# Patient Record
Sex: FEMALE | Race: WHITE | NOT HISPANIC OR LATINO | Employment: OTHER | URBAN - METROPOLITAN AREA
[De-identification: names, ages, dates, MRNs, and addresses within clinical notes are randomized per-mention and may not be internally consistent; named-entity substitution may affect disease eponyms.]

---

## 2018-03-26 DIAGNOSIS — Z12.39 SCREENING BREAST EXAMINATION: Primary | ICD-10-CM

## 2018-04-05 ENCOUNTER — HOSPITAL ENCOUNTER (OUTPATIENT)
Dept: RADIOLOGY | Facility: HOSPITAL | Age: 63
Discharge: HOME | End: 2018-04-05
Attending: OBSTETRICS & GYNECOLOGY
Payer: COMMERCIAL

## 2018-04-05 DIAGNOSIS — Z12.39 SCREENING BREAST EXAMINATION: ICD-10-CM

## 2018-04-05 PROCEDURE — 77067 SCR MAMMO BI INCL CAD: CPT | Mod: TC

## 2018-04-05 PROCEDURE — 77067 SCR MAMMO BI INCL CAD: CPT | Mod: 26,,, | Performed by: RADIOLOGY

## 2018-04-12 DIAGNOSIS — R10.9 ABDOMINAL PAIN: Primary | ICD-10-CM

## 2018-04-17 ENCOUNTER — HOSPITAL ENCOUNTER (OUTPATIENT)
Dept: RADIOLOGY | Facility: HOSPITAL | Age: 63
Discharge: HOME | End: 2018-04-17
Attending: FAMILY MEDICINE
Payer: COMMERCIAL

## 2018-04-17 DIAGNOSIS — K80.20 GALLSTONE: Primary | ICD-10-CM

## 2018-04-17 DIAGNOSIS — K80.20 GALLSTONE: ICD-10-CM

## 2018-04-17 DIAGNOSIS — R10.9 ABDOMINAL PAIN: ICD-10-CM

## 2018-04-17 PROCEDURE — 76705 ECHO EXAM OF ABDOMEN: CPT | Mod: 26,,, | Performed by: RADIOLOGY

## 2018-04-17 PROCEDURE — 76705 ECHO EXAM OF ABDOMEN: CPT | Mod: TC

## 2018-04-17 PROCEDURE — 78227 HEPATOBIL SYST IMAGE W/DRUG: CPT | Mod: 26,,, | Performed by: RADIOLOGY

## 2018-04-17 PROCEDURE — A9537 TC99M MEBROFENIN: HCPCS

## 2018-04-25 ENCOUNTER — OFFICE VISIT (OUTPATIENT)
Dept: SURGERY | Facility: CLINIC | Age: 63
End: 2018-04-25
Payer: COMMERCIAL

## 2018-04-25 VITALS
SYSTOLIC BLOOD PRESSURE: 145 MMHG | DIASTOLIC BLOOD PRESSURE: 93 MMHG | TEMPERATURE: 98 F | RESPIRATION RATE: 18 BRPM | BODY MASS INDEX: 23.95 KG/M2 | WEIGHT: 122 LBS | OXYGEN SATURATION: 96 % | HEART RATE: 85 BPM | HEIGHT: 60 IN

## 2018-04-25 DIAGNOSIS — Z12.11 ENCOUNTER FOR SCREENING COLONOSCOPY: Primary | ICD-10-CM

## 2018-04-25 DIAGNOSIS — K80.20 CALCULUS OF GALLBLADDER WITHOUT CHOLECYSTITIS WITHOUT OBSTRUCTION: ICD-10-CM

## 2018-04-25 DIAGNOSIS — R10.13 EPIGASTRIC PAIN: ICD-10-CM

## 2018-04-25 PROCEDURE — 99205 OFFICE O/P NEW HI 60 MIN: CPT | Mod: S$GLB,,, | Performed by: SURGERY

## 2018-04-25 RX ORDER — POLYETHYLENE GLYCOL 3350, SODIUM SULFATE ANHYDROUS, SODIUM BICARBONATE, SODIUM CHLORIDE, POTASSIUM CHLORIDE 236; 22.74; 6.74; 5.86; 2.97 G/4L; G/4L; G/4L; G/4L; G/4L
4 POWDER, FOR SOLUTION ORAL ONCE
Qty: 4000 ML | Refills: 0 | Status: SHIPPED | OUTPATIENT
Start: 2018-04-25 | End: 2018-04-25

## 2018-04-25 RX ORDER — SODIUM CHLORIDE 9 MG/ML
INJECTION, SOLUTION INTRAVENOUS CONTINUOUS
Status: CANCELLED | OUTPATIENT
Start: 2018-04-25
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
## 2018-04-27 ENCOUNTER — HOSPITAL ENCOUNTER (OUTPATIENT)
Dept: CARDIOLOGY | Facility: HOSPITAL | Age: 63
Discharge: HOME | End: 2018-04-27
Attending: SURGERY
Payer: COMMERCIAL

## 2018-04-27 DIAGNOSIS — Z12.11 ENCOUNTER FOR SCREENING COLONOSCOPY: ICD-10-CM

## 2018-04-27 PROCEDURE — 93005 ELECTROCARDIOGRAM TRACING: CPT

## 2018-05-01 ENCOUNTER — ANESTHESIA EVENT (OUTPATIENT)
Dept: SURGERY | Facility: HOSPITAL | Age: 63
End: 2018-05-01
Payer: COMMERCIAL

## 2018-05-01 ENCOUNTER — SURGERY (OUTPATIENT)
Age: 63
End: 2018-05-01

## 2018-05-01 ENCOUNTER — ANESTHESIA (OUTPATIENT)
Dept: SURGERY | Facility: HOSPITAL | Age: 63
End: 2018-05-01
Payer: COMMERCIAL

## 2018-05-01 ENCOUNTER — HOSPITAL ENCOUNTER (OUTPATIENT)
Facility: HOSPITAL | Age: 63
Discharge: HOME | End: 2018-05-01
Attending: SURGERY | Admitting: SURGERY
Payer: COMMERCIAL

## 2018-05-01 DIAGNOSIS — Z12.11 ENCOUNTER FOR SCREENING COLONOSCOPY: ICD-10-CM

## 2018-05-01 DIAGNOSIS — R10.13 EPIGASTRIC PAIN: Primary | ICD-10-CM

## 2018-05-01 PROCEDURE — G0121 COLON CA SCRN NOT HI RSK IND: HCPCS | Mod: ,,, | Performed by: SURGERY

## 2018-05-01 PROCEDURE — 27201012 HC FORCEPS, HOT/COLD, DISP: Performed by: SURGERY

## 2018-05-01 PROCEDURE — 63600175 PHARM REV CODE 636 W HCPCS: Performed by: NURSE ANESTHETIST, CERTIFIED REGISTERED

## 2018-05-01 PROCEDURE — 37000008 HC ANESTHESIA 1ST 15 MINUTES: Performed by: SURGERY

## 2018-05-01 PROCEDURE — 43239 EGD BIOPSY SINGLE/MULTIPLE: CPT | Mod: 51,,, | Performed by: SURGERY

## 2018-05-01 PROCEDURE — D9220A PRA ANESTHESIA: Mod: ,,, | Performed by: ANESTHESIOLOGY

## 2018-05-01 PROCEDURE — 43239 EGD BIOPSY SINGLE/MULTIPLE: CPT | Performed by: SURGERY

## 2018-05-01 PROCEDURE — 25000003 PHARM REV CODE 250: Performed by: NURSE ANESTHETIST, CERTIFIED REGISTERED

## 2018-05-01 PROCEDURE — G0105 COLORECTAL SCRN; HI RISK IND: HCPCS | Performed by: SURGERY

## 2018-05-01 PROCEDURE — 88342 IMHCHEM/IMCYTCHM 1ST ANTB: CPT | Performed by: PATHOLOGY

## 2018-05-01 PROCEDURE — 88305 TISSUE EXAM BY PATHOLOGIST: CPT | Mod: 26,,, | Performed by: PATHOLOGY

## 2018-05-01 PROCEDURE — 88342 IMHCHEM/IMCYTCHM 1ST ANTB: CPT | Mod: 26,,, | Performed by: PATHOLOGY

## 2018-05-01 PROCEDURE — 88305 TISSUE EXAM BY PATHOLOGIST: CPT | Performed by: PATHOLOGY

## 2018-05-01 PROCEDURE — 37000009 HC ANESTHESIA EA ADD 15 MINS: Performed by: SURGERY

## 2018-05-01 PROCEDURE — 45378 DIAGNOSTIC COLONOSCOPY: CPT | Performed by: SURGERY

## 2018-05-01 PROCEDURE — 25000003 PHARM REV CODE 250: Performed by: SURGERY

## 2018-05-01 RX ORDER — PROPOFOL 10 MG/ML
VIAL (ML) INTRAVENOUS
Status: DISCONTINUED | OUTPATIENT
Start: 2018-05-01 | End: 2018-05-01

## 2018-05-01 RX ORDER — MIDAZOLAM HYDROCHLORIDE 1 MG/ML
INJECTION, SOLUTION INTRAMUSCULAR; INTRAVENOUS
Status: DISCONTINUED | OUTPATIENT
Start: 2018-05-01 | End: 2018-05-01

## 2018-05-01 RX ORDER — EPHEDRINE SULFATE 50 MG/ML
INJECTION, SOLUTION INTRAVENOUS
Status: DISCONTINUED | OUTPATIENT
Start: 2018-05-01 | End: 2018-05-01

## 2018-05-01 RX ORDER — SODIUM CHLORIDE 9 MG/ML
INJECTION, SOLUTION INTRAVENOUS CONTINUOUS
Status: DISCONTINUED | OUTPATIENT
Start: 2018-05-01 | End: 2018-05-01 | Stop reason: HOSPADM

## 2018-05-01 RX ORDER — SODIUM, POTASSIUM,MAG SULFATES 17.5-3.13G
SOLUTION, RECONSTITUTED, ORAL ORAL
COMMUNITY
End: 2018-05-16 | Stop reason: ALTCHOICE

## 2018-05-01 RX ADMIN — PROPOFOL 20 MG: 10 INJECTION, EMULSION INTRAVENOUS at 11:05

## 2018-05-01 RX ADMIN — EPHEDRINE SULFATE 10 MG: 50 INJECTION INTRAMUSCULAR; INTRAVENOUS; SUBCUTANEOUS at 11:05

## 2018-05-01 RX ADMIN — PROPOFOL 30 MG: 10 INJECTION, EMULSION INTRAVENOUS at 11:05

## 2018-05-01 RX ADMIN — SODIUM CHLORIDE: 9 INJECTION, SOLUTION INTRAVENOUS at 09:05

## 2018-05-01 RX ADMIN — PROPOFOL 10 MG: 10 INJECTION, EMULSION INTRAVENOUS at 11:05

## 2018-05-01 RX ADMIN — MIDAZOLAM HYDROCHLORIDE 2 MG: 1 INJECTION, SOLUTION INTRAMUSCULAR; INTRAVENOUS at 10:05

## 2018-05-01 NOTE — PROVATION PATIENT INSTRUCTIONS
Discharge Summary/Instructions after an Endoscopic Procedure  Patient Name: Claudia Suresh  Patient MRN: [id number]  Patient YOB: 1955  Tuesday, May 01, 2018  Deven Boyd MD  RESTRICTIONS:  During your procedure today, you received medications for sedation.  These   medications may affect your judgment, balance and coordination.  Therefore,   for 24 hours, you have the following restrictions:   - DO NOT drive a car, operate machinery, make legal/financial decisions,   sign important papers or drink alcohol.    ACTIVITY:  The following day: return to full activity including work, except no heavy   lifting, straining or running for 3 days if polyps were removed.  DIET:  Eat and drink normally unless instructed otherwise.     TREATMENT FOR COMMON SIDE EFFECTS:  - Mild abdominal pain, nausea, belching, bloating or excessive gas:  rest,   eat lightly and use a heating pad.  - Sore Throat: treat with throat lozenges and/or gargle with warm salt   water.  - Because air was used during the procedure, expelling large amounts of air   from your rectum or belching is normal.  - If a bowel prep was taken, you may not have a bowel movement for 1-3 days.    This is normal.  SYMPTOMS TO WATCH FOR AND REPORT TO YOUR PHYSICIAN:  1. Abdominal pain or bloating, other than gas cramps.  2. Chest pain.  3. Back pain.  4. Signs of infection such as: chills or fever occurring within 24 hours   after the procedure.  5. Rectal bleeding, which would show as bright red, maroon, or black stools.   (A tablespoon of blood from the rectum is not serious, especially if   hemorrhoids are present.)  6. Vomiting.  7. Weakness or dizziness.  GO DIRECTLY TO THE NEAREST EMERGENCY ROOM IF YOU HAVE ANY OF THE FOLLOWING:      Difficulty breathing              Chills and/or fever over 101 F   Persistent vomiting and/or vomiting blood   Severe abdominal pain   Severe chest pain   Black, tarry stools   Bleeding- more than one  tablespoon   Any other symptom or condition that you feel may need urgent attention  Your doctor recommends these additional instructions:  If any biopsies were taken, your doctors clinic will contact you in 1 to 2   weeks with any results.  - Discharge patient to home (ambulatory).   - Resume previous diet.   - Continue present medications.   - Repeat colonoscopy in 10 years for surveillance.   - Return to my office in 2 weeks.  For questions, problems or results please call your physician - Deven Boyd MD at Work:  [phone].  [facility] EMERGENCY ROOM PHONE NUMBER: [phone]  IF A COMPLICATION OR EMERGENCY SITUATION ARISES AND YOU ARE UNABLE TO REACH   YOUR PHYSICIAN - GO DIRECTLY TO THE EMERGENCY ROOM.  MD Deven Puente MD  5/1/2018 11:42:55 AM  This report has been verified and signed electronically.
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
## 2018-05-01 NOTE — PROVATION PATIENT INSTRUCTIONS
Discharge Summary/Instructions after an Endoscopic Procedure  Patient Name: Claudia Suresh  Patient MRN: [id number]  Patient YOB: 1955  Tuesday, May 01, 2018  Deven Boyd MD  RESTRICTIONS:  During your procedure today, you received medications for sedation.  These   medications may affect your judgment, balance and coordination.  Therefore,   for 24 hours, you have the following restrictions:   - DO NOT drive a car, operate machinery, make legal/financial decisions,   sign important papers or drink alcohol.    ACTIVITY:  The following day: return to full activity including work, except no heavy   lifting, straining or running for 3 days if polyps were removed.  DIET:  Eat and drink normally unless instructed otherwise.     TREATMENT FOR COMMON SIDE EFFECTS:  - Mild abdominal pain, nausea, belching, bloating or excessive gas:  rest,   eat lightly and use a heating pad.  - Sore Throat: treat with throat lozenges and/or gargle with warm salt   water.  - Because air was used during the procedure, expelling large amounts of air   from your rectum or belching is normal.  - If a bowel prep was taken, you may not have a bowel movement for 1-3 days.    This is normal.  SYMPTOMS TO WATCH FOR AND REPORT TO YOUR PHYSICIAN:  1. Abdominal pain or bloating, other than gas cramps.  2. Chest pain.  3. Back pain.  4. Signs of infection such as: chills or fever occurring within 24 hours   after the procedure.  5. Rectal bleeding, which would show as bright red, maroon, or black stools.   (A tablespoon of blood from the rectum is not serious, especially if   hemorrhoids are present.)  6. Vomiting.  7. Weakness or dizziness.  GO DIRECTLY TO THE NEAREST EMERGENCY ROOM IF YOU HAVE ANY OF THE FOLLOWING:      Difficulty breathing              Chills and/or fever over 101 F   Persistent vomiting and/or vomiting blood   Severe abdominal pain   Severe chest pain   Black, tarry stools   Bleeding- more than one  tablespoon   Any other symptom or condition that you feel may need urgent attention  Your doctor recommends these additional instructions:  If any biopsies were taken, your doctors clinic will contact you in 1 to 2   weeks with any results.  - Discharge patient to home (ambulatory).   - Resume previous diet.   - Continue present medications.   - Await pathology results.   - Repeat upper endoscopy in 3 years for surveillance.   - Return to my office in 2 weeks.  For questions, problems or results please call your physician - Deven Boyd MD at Work:  [phone].  [facility] EMERGENCY ROOM PHONE NUMBER: [phone]  IF A COMPLICATION OR EMERGENCY SITUATION ARISES AND YOU ARE UNABLE TO REACH   YOUR PHYSICIAN - GO DIRECTLY TO THE EMERGENCY ROOM.  MD Deven Puente MD  5/1/2018 11:39:40 AM  This report has been verified and signed electronically.

## 2018-05-01 NOTE — ANESTHESIA POSTPROCEDURE EVALUATION
Anesthesia Post Evaluation    Patient: Claudia Suresh    Procedure(s) Performed: Procedure(s) (LRB):  COLONOSCOPY (N/A)  ESOPHAGOGASTRODUODENOSCOPY (EGD) (N/A)    Final Anesthesia Type: MAC  Patient location during evaluation: PACU  Patient participation: Yes- Able to Participate  Level of consciousness: awake and alert  Post-procedure vital signs: reviewed and stable  Pain management: adequate  Airway patency: patent  PONV status at discharge: No PONV  Anesthetic complications: no      Cardiovascular status: blood pressure returned to baseline  Respiratory status: unassisted  Hydration status: euvolemic  Follow-up not needed.        Visit Vitals  BP (!) 141/62   Pulse 87   Temp 36.7 °C (98.1 °F) (Oral)   Resp 18   Ht 5' (1.524 m)   Wt 55.3 kg (121 lb 14.6 oz)   SpO2 97%   Breastfeeding? No   BMI 23.81 kg/m²       Pain/Sherrie Score: Pain Assessment Performed: Yes (5/1/2018 12:05 PM)  Presence of Pain: complains of pain/discomfort (5/1/2018 12:05 PM)  Sherrie Score: 10 (5/1/2018 12:05 PM)

## 2018-05-01 NOTE — ANESTHESIA PREPROCEDURE EVALUATION
05/01/2018  Claudia Suresh is a 62 y.o., female.    Anesthesia Evaluation    I have reviewed the Patient Summary Reports.        Review of Systems  Anesthesia Hx:  Neg history of prior surgery. Denies Family Hx of Anesthesia complications.   Denies Personal Hx of Anesthesia complications.   Hematology/Oncology:  Hematology Normal   Oncology Normal     EENT/Dental:EENT/Dental Normal   Cardiovascular:   Hypertension, well controlled    Pulmonary:  Pulmonary Normal    Renal/:  Renal/ Normal     Hepatic/GI:  Hepatic/GI Normal    Musculoskeletal:  Musculoskeletal Normal    Neurological:  Neurology Normal    Endocrine:  Endocrine Normal    Dermatological:  Skin Normal    Psych:  Psychiatric Normal           Physical Exam  General:  Well nourished    Airway/Jaw/Neck:  AIRWAY FINDINGS: Normal      Eyes/Ears/Nose:  EYES/EARS/NOSE FINDINGS: Normal   Dental:  DENTAL FINDINGS: Normal   Chest/Lungs:  Chest/Lungs Clear    Heart/Vascular:  Heart Findings: Normal Heart murmur: negative Vascular Findings: Normal    Abdomen:  Abdomen Findings: Normal    Musculoskeletal:  Musculoskeletal Findings: Normal   Skin:  Skin Findings: Normal    Mental Status:  Mental Status Findings: Normal        Anesthesia Plan  Type of Anesthesia, risks & benefits discussed:  Anesthesia Type:  MAC  Patient's Preference:   Intra-op Monitoring Plan: standard ASA monitors  Intra-op Monitoring Plan Comments:   Post Op Pain Control Plan:   Post Op Pain Control Plan Comments:   Induction:    Beta Blocker:  Patient is not currently on a Beta-Blocker (No further documentation required).       Informed Consent:  Anesthesia consent signed with patient.  ASA Score: 2     Day of Surgery Review of History & Physical:    H&P update referred to the provider.         Ready For Surgery From Anesthesia Perspective.

## 2018-05-01 NOTE — DISCHARGE SUMMARY
Discharge Note        SUMMARY     Admit Date: 5/1/2018    Attending Physician: Deven Boyd MD     Discharge Physician: Deven Boyd MD    Discharge Date: 5/1/2018 11:45 AM      Hospital Course: Patient tolerated procedure well.     Disposition: Home or Self Care    Patient Instructions:   Current Discharge Medication List      CONTINUE these medications which have NOT CHANGED    Details   gabapentin (NEURONTIN) 300 MG capsule TAKE ONE CAPSULE BY MOUTH THREE TIMES DAILY  Qty: 90 capsule, Refills: 0    Associated Diagnoses: Status post cervical spinal fusion      oxycodone-acetaminophen (PERCOCET) 5-325 mg per tablet Take 1 tablet by mouth every 4 (four) hours as needed for Pain.  Qty: 90 tablet, Refills: 0    Associated Diagnoses: S/P cervical spinal fusion      sodium,potassium,mag sulfates (SUPREP BOWEL PREP KIT) 17.5-3.13-1.6 gram SolR Take by mouth.      spironolactone (ALDACTONE) 25 MG tablet Take 25 mg by mouth 2 (two) times daily.             Discharge Procedure Orders (must include Diet, Follow-up, Activity):    Discharge Procedure Orders (must include Diet, Follow-up, Activity)  Diet general     Activity as tolerated     Call MD for:  temperature >100.4     Call MD for:  persistent nausea and vomiting     Call MD for:  severe uncontrolled pain     Call MD for:  difficulty breathing, headache or visual disturbances     Call MD for:  redness, tenderness, or signs of infection (pain, swelling, redness, odor or green/yellow discharge around incision site)     Call MD for:  persistent dizziness or light-headedness          Follow Up:  Follow up as scheduled.  Resume routine diet.  Activity as tolerated.    No driving day of procedure.

## 2018-05-01 NOTE — INTERVAL H&P NOTE
The patient has been examined and the H&P has been reviewed:    I concur with the findings and no changes have occurred since H&P was written.    Surgery risks, benefits and alternative options discussed and understood by patient/family.          Active Hospital Problems    Diagnosis  POA    Epigastric pain [R10.13]  Yes      Resolved Hospital Problems    Diagnosis Date Resolved POA   No resolved problems to display.

## 2018-05-01 NOTE — TRANSFER OF CARE
Anesthesia Transfer of Care Note    Patient: Claudia Suresh    Procedure(s) Performed: Procedure(s) (LRB):  COLONOSCOPY (N/A)  ESOPHAGOGASTRODUODENOSCOPY (EGD) (N/A)    Patient location: PACU    Anesthesia Type: MAC    Transport from OR: Transported from OR on room air with adequate spontaneous ventilation    Post pain: adequate analgesia    Post assessment: no apparent anesthetic complications and tolerated procedure well    Post vital signs: stable    Level of consciousness: awake, alert and oriented    Nausea/Vomiting: no nausea/vomiting    Complications: none    Transfer of care protocol was followed      Last vitals:   Visit Vitals  /80 (BP Location: Right arm, Patient Position: Lying)   Pulse (!) 59   Temp 36.7 °C (98.1 °F) (Oral)   Resp 14   Ht 5' (1.524 m)   Wt 55.3 kg (121 lb 14.6 oz)   Breastfeeding? No   BMI 23.81 kg/m²

## 2018-05-03 VITALS
RESPIRATION RATE: 18 BRPM | HEIGHT: 60 IN | SYSTOLIC BLOOD PRESSURE: 141 MMHG | TEMPERATURE: 98 F | DIASTOLIC BLOOD PRESSURE: 62 MMHG | WEIGHT: 121.94 LBS | OXYGEN SATURATION: 97 % | BODY MASS INDEX: 23.94 KG/M2 | HEART RATE: 87 BPM

## 2018-05-07 ENCOUNTER — TELEPHONE (OUTPATIENT)
Dept: SURGERY | Facility: CLINIC | Age: 63
End: 2018-05-07

## 2018-05-07 NOTE — TELEPHONE ENCOUNTER
----- Message from Joel Boyd sent at 5/7/2018  9:01 AM CDT -----  Contact: Pt  Name of Who is Calling: Claudia      What is the request in detail: Pt is calling to schedule her follow up      Can the clinic reply by [facility]: no      [facility] [phone] (home)

## 2018-05-16 ENCOUNTER — OFFICE VISIT (OUTPATIENT)
Dept: SURGERY | Facility: CLINIC | Age: 63
End: 2018-05-16
Payer: COMMERCIAL

## 2018-05-16 VITALS
WEIGHT: 122 LBS | TEMPERATURE: 97 F | RESPIRATION RATE: 18 BRPM | SYSTOLIC BLOOD PRESSURE: 120 MMHG | HEIGHT: 61 IN | OXYGEN SATURATION: 95 % | HEART RATE: 64 BPM | DIASTOLIC BLOOD PRESSURE: 86 MMHG | BODY MASS INDEX: 23.03 KG/M2

## 2018-05-16 DIAGNOSIS — K80.20 SYMPTOMATIC CHOLELITHIASIS: ICD-10-CM

## 2018-05-16 DIAGNOSIS — K29.30 CHRONIC SUPERFICIAL GASTRITIS WITHOUT BLEEDING: Primary | ICD-10-CM

## 2018-05-16 PROCEDURE — 99213 OFFICE O/P EST LOW 20 MIN: CPT | Mod: S$GLB,,, | Performed by: SURGERY

## 2018-05-16 RX ORDER — SUCRALFATE 1 G/1
1 TABLET ORAL 4 TIMES DAILY
Qty: 120 TABLET | Refills: 0 | Status: SHIPPED | OUTPATIENT
Start: 2018-05-16 | End: 2018-10-29

## 2018-05-16 RX ORDER — PANTOPRAZOLE SODIUM 40 MG/1
40 TABLET, DELAYED RELEASE ORAL DAILY
Qty: 30 TABLET | Refills: 3 | Status: SHIPPED | OUTPATIENT
Start: 2018-05-16 | End: 2018-08-09

## 2018-05-16 NOTE — PROGRESS NOTES
"[facility] Surgery  Follow-up    Subjective:       Patient ID: Claudia Suresh is a 62 y.o. female.    Chief Complaint: Follow-up (EGD/Colonoscopy)      HPI:  Claudia Suresh is a 62 y.o. female with a history of epigastric pain now who was undergone EGD and colonoscopy.  EGD showed chronic gastritis on biopsy, mild.  Helicobacter pylori species negative. Since the last visit and the EGD colonoscopy the patient continues to have similar symptoms.  Epigastric pain that is worse after food ingestion.  No other aggravating or alleviating factors.  It has been going on since prior to the last visit.  No nausea or vomiting.  She presents today for repeat examination.    Review of Systems   Constitutional: Negative for activity change, appetite change, chills and fever.   HENT: Negative for congestion, dental problem and ear discharge.    Eyes: Negative for discharge and itching.   Respiratory: Negative for apnea, choking and chest tightness.    Cardiovascular: Negative for chest pain and leg swelling.   Gastrointestinal: Positive for abdominal pain. Negative for abdominal distention, anal bleeding, constipation, diarrhea and nausea.   Endocrine: Negative for cold intolerance and heat intolerance.   Genitourinary: Negative for difficulty urinating and dyspareunia.   Musculoskeletal: Negative for arthralgias and back pain.   Skin: Negative for color change and pallor.   Neurological: Negative for dizziness and facial asymmetry.   Hematological: Negative for adenopathy. Does not bruise/bleed easily.   Psychiatric/Behavioral: Negative for agitation and behavioral problems.       Objective:      Vitals:    05/16/18 0903   BP: 120/86   BP Location: Right arm   Patient Position: Sitting   Pulse: 64   Resp: 18   Temp: 97.4 °F (36.3 °C)   TempSrc: Oral   SpO2: 95%   Weight: 55.3 kg (122 lb)   Height: 5' 1" (1.549 m)     Physical Exam   Constitutional: She is oriented to person, place, and time. She appears " well-developed and well-nourished. No distress.   HENT:   Head: Normocephalic and atraumatic.   Eyes: EOM are normal. Pupils are equal, round, and reactive to light.   Neck: Normal range of motion. Neck supple. No thyromegaly present.   Cardiovascular: Normal rate and regular rhythm.    Pulmonary/Chest: Effort normal and breath sounds normal.   Abdominal: Soft. Bowel sounds are normal. She exhibits no distension. There is no tenderness.       Musculoskeletal: Normal range of motion. She exhibits no edema or deformity.   Neurological: She is alert and oriented to person, place, and time. No cranial nerve deficit.   Skin: Skin is warm. Capillary refill takes less than 2 seconds. No erythema.   Psychiatric: She has a normal mood and affect. Her behavior is normal.     Diagnostics Review: CT: Reviewed; I have reviewed the patient's CT scan from last fall 2017.  My interpretation of the images is that the patient does have cholelithiasis.  It is in line with the radiologist's read.     Assessment:       1. Chronic superficial gastritis without bleeding    2. Symptomatic cholelithiasis        Plan:   Chronic superficial gastritis without bleeding  -     pantoprazole (PROTONIX) 40 MG tablet; Take 1 tablet (40 mg total) by mouth once daily.  Dispense: 30 tablet; Refill: 3  -     sucralfate (CARAFATE) 1 gram tablet; Take 1 tablet (1 g total) by mouth 4 (four) times daily.  Dispense: 120 tablet; Refill: 0    Symptomatic cholelithiasis        Medical Decision Making/Counseling:  Patient has continued, worsening epigastric pain. Biopsies reviewed with the patient. This shows chronic gastritis.  Recommendations at this time are for initiation of Protonix and Carafate for a 2 week.  And have the patient re-presented for follow-up evaluation.  Ultimately I believe the patient is a candidate for a cholecystectomy given the gallstones seen on CT scan and the symptoms of bloating belching epigastric pain postprandial.  Follow-up 2  weeks for discussion of laparoscopic versus open cholecystectomy.

## 2018-05-30 ENCOUNTER — OFFICE VISIT (OUTPATIENT)
Dept: SURGERY | Facility: CLINIC | Age: 63
End: 2018-05-30
Payer: COMMERCIAL

## 2018-05-30 VITALS
WEIGHT: 123 LBS | RESPIRATION RATE: 18 BRPM | DIASTOLIC BLOOD PRESSURE: 80 MMHG | OXYGEN SATURATION: 96 % | BODY MASS INDEX: 24.15 KG/M2 | SYSTOLIC BLOOD PRESSURE: 148 MMHG | TEMPERATURE: 98 F | HEART RATE: 58 BPM | HEIGHT: 60 IN

## 2018-05-30 DIAGNOSIS — K80.20 SYMPTOMATIC CHOLELITHIASIS: Primary | ICD-10-CM

## 2018-05-30 PROCEDURE — 99214 OFFICE O/P EST MOD 30 MIN: CPT | Mod: S$GLB,,, | Performed by: SURGERY

## 2018-05-30 RX ORDER — SODIUM CHLORIDE 9 MG/ML
INJECTION, SOLUTION INTRAVENOUS CONTINUOUS
Status: CANCELLED | OUTPATIENT
Start: 2018-05-30

## 2018-05-30 NOTE — H&P
[facility] Surgery H&P Note    Subjective:       Patient ID: Claudia Suresh is a 62 y.o. female.    Chief Complaint: Follow-up (2 weeks Gastritis)    HPI:  Claudia Suresh is a 62 y.o. 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 Patient is underwent EGD which showed mild chronic gastritis.  She has been treated with Protonix as well as Carafate with no resolution.  Patient continues to endorse epigastric/right upper quadrant pain, bloating most notably after coffee with cream ran taken the morning.  No other aggravating or alleviating factors.  Patient recently underwent ultrasound and HIDA scan which were negative however patient previously will underwent MRI back in the fall which showed cholelithiasis.  Patient reports classic symptoms of symptomatic cholelithiasis.  Symptoms 6/10 when they occur.  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    Essential hypertension     Tobacco abuse 1/7/2016     Past Surgical History:   Procedure Laterality Date    COLONOSCOPY  12/06/2013    COLONOSCOPY N/A 5/1/2018    Procedure: COLONOSCOPY;  Surgeon: Deven Boyd MD;  Location: [facility];  Service: Endoscopy;  Laterality: N/A;    ESOPHAGOGASTRODUODENOSCOPY  12/06/2013    NECK SURGERY      none       Family History   Problem Relation Age of Onset    Stroke Mother     Ovarian cancer Mother     Cancer Father      Social History     Social History    Marital status:      Spouse name: N/A    Number of children: N/A    Years of education: N/A     Social History Main Topics    Smoking status: Current Every Day Smoker     Packs/day: 0.50     Types: Cigarettes    Smokeless tobacco: Never Used    Alcohol use  Yes      Comment: social    Drug use: No    Sexual activity: Yes     Partners: Male     Other Topics Concern    None     Social History Narrative    None       Current Outpatient Prescriptions   Medication Sig Dispense Refill    gabapentin (NEURONTIN) 300 MG capsule TAKE ONE CAPSULE BY MOUTH THREE TIMES DAILY 90 capsule 0    oxycodone-acetaminophen (PERCOCET) 5-325 mg per tablet Take 1 tablet by mouth every 4 (four) hours as needed for Pain. 90 tablet 0    pantoprazole (PROTONIX) 40 MG tablet Take 1 tablet (40 mg total) by mouth once daily. 30 tablet 3    spironolactone (ALDACTONE) 25 MG tablet Take 25 mg by mouth 2 (two) times daily.      sucralfate (CARAFATE) 1 gram tablet Take 1 tablet (1 g total) by mouth 4 (four) times daily. 120 tablet 0     Current Facility-Administered Medications   Medication Dose Route Frequency Provider Last Rate Last Dose    ceFOXItin (MEFOXIN) 2 g in dextrose 5 % 100 mL IVPB  2 g Intravenous On Call Procedure Deven Boyd MD         Review of patient's allergies indicates:  No Known Allergies    Review of Systems   Constitutional: Negative for activity change, appetite change, chills and fever.   HENT: Negative for congestion, dental problem and ear discharge.    Eyes: Negative for discharge and itching.   Respiratory: Negative for apnea, choking and chest tightness.    Cardiovascular: Negative for chest pain and leg swelling.   Gastrointestinal: Positive for abdominal distention and abdominal pain. Negative for anal bleeding, constipation, diarrhea and nausea.   Endocrine: Negative for cold intolerance and heat intolerance.   Genitourinary: Negative for difficulty urinating and dyspareunia.   Musculoskeletal: Negative for arthralgias and back pain.   Skin: Negative for color change and pallor.   Neurological: Negative for dizziness and facial asymmetry.   Hematological: Negative for adenopathy. Does not bruise/bleed easily.   Psychiatric/Behavioral: Negative for  agitation and behavioral problems.       Objective:      Vitals:    05/30/18 0849   BP: (!) 148/80   BP Location: Right arm   Patient Position: Sitting   Pulse: (!) 58   Resp: 18   Temp: 97.5 °F (36.4 °C)   TempSrc: Oral   SpO2: 96%   Weight: 55.8 kg (123 lb)   Height: 5' (1.524 m)     Physical Exam   Constitutional: She is oriented to person, place, and time. She appears well-developed and well-nourished. No distress.   HENT:   Head: Normocephalic and atraumatic.   Eyes: EOM are normal. Pupils are equal, round, and reactive to light.   Neck: Normal range of motion. Neck supple. No thyromegaly present.   Cardiovascular: Normal rate and regular rhythm.    Pulmonary/Chest: Effort normal and breath sounds normal.   Abdominal: Soft. Bowel sounds are normal. She exhibits no distension. There is no tenderness.       Musculoskeletal: Normal range of motion. She exhibits no edema or deformity.   Neurological: She is alert and oriented to person, place, and time. No cranial nerve deficit.   Skin: Skin is warm. Capillary refill takes less than 2 seconds. No erythema.   Psychiatric: She has a normal mood and affect. Her behavior is normal.       Lab Review:   CBC:   Lab Results   Component Value Date    WBC 5.61 04/27/2018    RBC 4.66 04/27/2018    HGB 14.4 04/27/2018    HCT 44.2 04/27/2018     04/27/2018     BMP:   Lab Results   Component Value Date     04/27/2018     04/27/2018    K 4.1 04/27/2018     04/27/2018    CO2 25 04/27/2018    BUN 8 04/27/2018    CREATININE 0.5 04/27/2018    CALCIUM 9.9 04/27/2018     Diagnostics Review: ECG: Reviewed   Previous US and HIDA scan reviewed.  Previous MRI reviewed to show stones.     Assessment:       1. Symptomatic cholelithiasis        Plan:   Symptomatic cholelithiasis  -     Case Request Operating Room: CHOLECYSTECTOMY-LAPAROSCOPIC  -     Place in Outpatient; Standing  -     Vital signs; Standing  -     Insert peripheral IV; Standing  -     Verify  beta-blocker dose taken within 24 hours if patient is prescribed beta-blocker; Standing  -     Height and weight; Standing  -     Intake and output; Standing  -     Verify discontinuation of antithrombotics; Standing  -     POCT glucose; Standing  -     Verify blood consent; Standing  -     Verify consent; Standing  -     Clip and Prep Abdomen Zyphoid to Pubis; Standing  -     Chlorhexidine (CHG) 2% Wipes; Standing  -     Hibiclens shower; Standing  -     Hibiclens shower; Standing  -     Notify Physician; Standing  -     Diet NPO; Standing  -     Early ambulation; Standing  -     Place SAMUEL hose; Standing  -     Place sequential compression device; Standing  -     Pulse Oximetry Q4H; Standing  -     Comprehensive metabolic panel; Standing    Other orders  -     0.9%  NaCl infusion; Inject into the vein continuous.  -     IP VTE LOW RISK PATIENT; Standing  -     ceFOXItin (MEFOXIN) 2 g in dextrose 5 % 100 mL IVPB; Inject 2 g into the vein On call Procedure (surgery).        Medical Decision Making/Counseling:  Patient has symptoms of symptomatic stone disease.  I believe the patient would benefit from a cholecystectomy.  She has been given 2 weeks of Carafate with Protonix to help with her gastritis with no resolution of symptoms.  Will proceed with laparoscopic versus open cholecystectomy.    I have reviewed the patient's history physical examination as well as the patient's laboratory and radiologic studies.  She has what appears to be symptomatic cholelithiasis.  Postprandial pain which is colicky in nature.  Patient on ultrasound shows cholelithiasis, no evidence of cholecystitis.  I believe given the patient's symptomatology she would benefit from an elective laparoscopic versus open cholecystectomy.  Today in clinic we had a counseling session which lasted 30-40 minutes.  Counseling involved risk and benefits discussion.  Risks of cholecystectomy were discussed in detail.  I discussed that there is a 1 and 200  chance (0.5%) common bile duct injury.  I discussed the common bile duct is the drainage tube of the liver.  I discussed and such patients with cholecystectomy which results in injury of the common bile duct, a larger surgery is required called a hepaticojejunostomy.  I further discussed the risk of conversion to an open operation.  I discussed that 100% of the time I start cholecystectomies laparoscopically in the elective setting, but only 95% of the time am I able to complete the cholecystectomy laparoscopically.  I discussed that 5% of the time in open (larger incision) surgery is required for the safety of the patient.  I discussed the reasons for conversion to an open operation (how cholecystectomies use to be always removed) included significant scarring, adhesions, bleeding, or concern for injury of the surrounding structures which needed repair etc.  I also discussed with the patient that the intrinsic risks of surgery include bleeding, infection, need for further surgeries, admission to the hospital, in the intrinsic risks of anesthesia for which the patient will have a discussion on the surgical date with the anesthesiologist about.  With this discussion, I drew the patient a picture to assist with her understanding.  At the end of the discussion the patient understood the risks and wished to proceed in the near future.

## 2018-06-13 DIAGNOSIS — Z09 POSTOP CHECK: Primary | ICD-10-CM

## 2018-06-13 RX ORDER — ONDANSETRON 4 MG/1
4 TABLET, FILM COATED ORAL EVERY 6 HOURS PRN
Qty: 30 TABLET | Refills: 0 | Status: SHIPPED | OUTPATIENT
Start: 2018-06-13 | End: 2018-06-23

## 2018-06-13 RX ORDER — OXYCODONE AND ACETAMINOPHEN 5; 325 MG/1; MG/1
1 TABLET ORAL EVERY 4 HOURS PRN
Qty: 30 TABLET | Refills: 0 | Status: ON HOLD | OUTPATIENT
Start: 2018-06-13 | End: 2018-06-18 | Stop reason: HOSPADM

## 2018-06-18 ENCOUNTER — ANESTHESIA EVENT (OUTPATIENT)
Dept: SURGERY | Facility: HOSPITAL | Age: 63
End: 2018-06-18
Payer: COMMERCIAL

## 2018-06-18 ENCOUNTER — ANESTHESIA (OUTPATIENT)
Dept: SURGERY | Facility: HOSPITAL | Age: 63
End: 2018-06-18
Payer: COMMERCIAL

## 2018-06-18 ENCOUNTER — HOSPITAL ENCOUNTER (OUTPATIENT)
Facility: HOSPITAL | Age: 63
Discharge: HOME | End: 2018-06-18
Attending: SURGERY | Admitting: SURGERY
Payer: COMMERCIAL

## 2018-06-18 VITALS
RESPIRATION RATE: 12 BRPM | HEART RATE: 57 BPM | HEIGHT: 61 IN | SYSTOLIC BLOOD PRESSURE: 127 MMHG | OXYGEN SATURATION: 94 % | DIASTOLIC BLOOD PRESSURE: 78 MMHG | BODY MASS INDEX: 23.03 KG/M2 | WEIGHT: 122 LBS | TEMPERATURE: 98 F

## 2018-06-18 DIAGNOSIS — K75.81 NASH (NONALCOHOLIC STEATOHEPATITIS): Primary | ICD-10-CM

## 2018-06-18 DIAGNOSIS — K80.20 SYMPTOMATIC CHOLELITHIASIS: ICD-10-CM

## 2018-06-18 LAB
ALBUMIN SERPL BCP-MCNC: 4.3 G/DL
ALP SERPL-CCNC: 75 U/L
ALT SERPL W/O P-5'-P-CCNC: 22 U/L
ANION GAP SERPL CALC-SCNC: 9 MMOL/L
AST SERPL-CCNC: 26 U/L
BILIRUB SERPL-MCNC: 0.9 MG/DL
BUN SERPL-MCNC: 9 MG/DL
CALCIUM SERPL-MCNC: 10 MG/DL
CHLORIDE SERPL-SCNC: 105 MMOL/L
CO2 SERPL-SCNC: 25 MMOL/L
CREAT SERPL-MCNC: 0.6 MG/DL
EST. GFR  (AFRICAN AMERICAN): >60 ML/MIN/1.73 M^2
EST. GFR  (NON AFRICAN AMERICAN): >60 ML/MIN/1.73 M^2
GLUCOSE SERPL-MCNC: 94 MG/DL
POTASSIUM SERPL-SCNC: 4 MMOL/L
PROT SERPL-MCNC: 7.4 G/DL
SODIUM SERPL-SCNC: 139 MMOL/L

## 2018-06-18 PROCEDURE — 71000016 HC POSTOP RECOV ADDL HR: Performed by: SURGERY

## 2018-06-18 PROCEDURE — 71000015 HC POSTOP RECOV 1ST HR: Performed by: SURGERY

## 2018-06-18 PROCEDURE — 63600175 PHARM REV CODE 636 W HCPCS: Performed by: NURSE ANESTHETIST, CERTIFIED REGISTERED

## 2018-06-18 PROCEDURE — 25000003 PHARM REV CODE 250: Performed by: SURGERY

## 2018-06-18 PROCEDURE — 25000003 PHARM REV CODE 250: Performed by: NURSE ANESTHETIST, CERTIFIED REGISTERED

## 2018-06-18 PROCEDURE — D9220A PRA ANESTHESIA: Mod: ,,, | Performed by: ANESTHESIOLOGY

## 2018-06-18 PROCEDURE — 36415 COLL VENOUS BLD VENIPUNCTURE: CPT

## 2018-06-18 PROCEDURE — 88304 TISSUE EXAM BY PATHOLOGIST: CPT | Mod: 26,,, | Performed by: PATHOLOGY

## 2018-06-18 PROCEDURE — 36000709 HC OR TIME LEV III EA ADD 15 MIN: Performed by: SURGERY

## 2018-06-18 PROCEDURE — 88307 TISSUE EXAM BY PATHOLOGIST: CPT | Mod: 26,,, | Performed by: PATHOLOGY

## 2018-06-18 PROCEDURE — 37000009 HC ANESTHESIA EA ADD 15 MINS: Performed by: SURGERY

## 2018-06-18 PROCEDURE — 36000708 HC OR TIME LEV III 1ST 15 MIN: Performed by: SURGERY

## 2018-06-18 PROCEDURE — 71000033 HC RECOVERY, INTIAL HOUR: Performed by: SURGERY

## 2018-06-18 PROCEDURE — 47562 LAPAROSCOPIC CHOLECYSTECTOMY: CPT | Mod: ,,, | Performed by: SURGERY

## 2018-06-18 PROCEDURE — 47379 UNLISTED LAPS PX LIVER: CPT | Mod: 80,,, | Performed by: SURGERY

## 2018-06-18 PROCEDURE — 88313 SPECIAL STAINS GROUP 2: CPT | Performed by: PATHOLOGY

## 2018-06-18 PROCEDURE — 80053 COMPREHEN METABOLIC PANEL: CPT

## 2018-06-18 PROCEDURE — 88307 TISSUE EXAM BY PATHOLOGIST: CPT | Performed by: PATHOLOGY

## 2018-06-18 PROCEDURE — 27201423 OPTIME MED/SURG SUP & DEVICES STERILE SUPPLY: Performed by: SURGERY

## 2018-06-18 PROCEDURE — 88313 SPECIAL STAINS GROUP 2: CPT | Mod: 26,,, | Performed by: PATHOLOGY

## 2018-06-18 PROCEDURE — 63600175 PHARM REV CODE 636 W HCPCS: Performed by: ANESTHESIOLOGY

## 2018-06-18 PROCEDURE — 37000008 HC ANESTHESIA 1ST 15 MINUTES: Performed by: SURGERY

## 2018-06-18 PROCEDURE — 47562 LAPAROSCOPIC CHOLECYSTECTOMY: CPT | Mod: 80,,, | Performed by: SURGERY

## 2018-06-18 PROCEDURE — 47379 UNLISTED LAPS PX LIVER: CPT | Mod: ,,, | Performed by: SURGERY

## 2018-06-18 RX ORDER — DIPHENHYDRAMINE HYDROCHLORIDE 50 MG/ML
12.5 INJECTION INTRAMUSCULAR; INTRAVENOUS
Status: DISCONTINUED | OUTPATIENT
Start: 2018-06-18 | End: 2018-06-18 | Stop reason: HOSPADM

## 2018-06-18 RX ORDER — DOCUSATE SODIUM 100 MG/1
100 CAPSULE, LIQUID FILLED ORAL DAILY
Qty: 30 CAPSULE | Refills: 0 | Status: SHIPPED | OUTPATIENT
Start: 2018-06-18 | End: 2018-10-29

## 2018-06-18 RX ORDER — SODIUM CHLORIDE, SODIUM LACTATE, POTASSIUM CHLORIDE, CALCIUM CHLORIDE 600; 310; 30; 20 MG/100ML; MG/100ML; MG/100ML; MG/100ML
125 INJECTION, SOLUTION INTRAVENOUS CONTINUOUS
Status: DISCONTINUED | OUTPATIENT
Start: 2018-06-18 | End: 2018-06-18 | Stop reason: HOSPADM

## 2018-06-18 RX ORDER — ONDANSETRON 4 MG/1
4 TABLET, FILM COATED ORAL EVERY 6 HOURS PRN
Qty: 30 TABLET | Refills: 0 | Status: SHIPPED | OUTPATIENT
Start: 2018-06-18 | End: 2018-06-28

## 2018-06-18 RX ORDER — ONDANSETRON 2 MG/ML
4 INJECTION INTRAMUSCULAR; INTRAVENOUS DAILY PRN
Status: DISCONTINUED | OUTPATIENT
Start: 2018-06-18 | End: 2018-06-18 | Stop reason: HOSPADM

## 2018-06-18 RX ORDER — GLYCOPYRROLATE 0.2 MG/ML
INJECTION INTRAMUSCULAR; INTRAVENOUS
Status: DISCONTINUED | OUTPATIENT
Start: 2018-06-18 | End: 2018-06-18

## 2018-06-18 RX ORDER — SODIUM CHLORIDE 9 MG/ML
INJECTION, SOLUTION INTRAVENOUS CONTINUOUS
Status: DISCONTINUED | OUTPATIENT
Start: 2018-06-18 | End: 2018-06-18 | Stop reason: HOSPADM

## 2018-06-18 RX ORDER — PROPOFOL 10 MG/ML
VIAL (ML) INTRAVENOUS
Status: DISCONTINUED | OUTPATIENT
Start: 2018-06-18 | End: 2018-06-18

## 2018-06-18 RX ORDER — SODIUM CHLORIDE, SODIUM LACTATE, POTASSIUM CHLORIDE, CALCIUM CHLORIDE 600; 310; 30; 20 MG/100ML; MG/100ML; MG/100ML; MG/100ML
INJECTION, SOLUTION INTRAVENOUS CONTINUOUS
Status: DISCONTINUED | OUTPATIENT
Start: 2018-06-18 | End: 2018-06-18 | Stop reason: HOSPADM

## 2018-06-18 RX ORDER — MIDAZOLAM HYDROCHLORIDE 1 MG/ML
INJECTION, SOLUTION INTRAMUSCULAR; INTRAVENOUS
Status: DISCONTINUED | OUTPATIENT
Start: 2018-06-18 | End: 2018-06-18

## 2018-06-18 RX ORDER — LIDOCAINE HYDROCHLORIDE 10 MG/ML
1 INJECTION, SOLUTION EPIDURAL; INFILTRATION; INTRACAUDAL; PERINEURAL ONCE
Status: DISCONTINUED | OUTPATIENT
Start: 2018-06-18 | End: 2018-06-18 | Stop reason: HOSPADM

## 2018-06-18 RX ORDER — CISATRACURIUM BESYLATE 2 MG/ML
INJECTION, SOLUTION INTRAVENOUS
Status: DISCONTINUED | OUTPATIENT
Start: 2018-06-18 | End: 2018-06-18

## 2018-06-18 RX ORDER — SUCCINYLCHOLINE CHLORIDE 20 MG/ML
INJECTION INTRAMUSCULAR; INTRAVENOUS
Status: DISCONTINUED | OUTPATIENT
Start: 2018-06-18 | End: 2018-06-18

## 2018-06-18 RX ORDER — NEOSTIGMINE METHYLSULFATE 1 MG/ML
INJECTION, SOLUTION INTRAVENOUS
Status: DISCONTINUED | OUTPATIENT
Start: 2018-06-18 | End: 2018-06-18

## 2018-06-18 RX ORDER — ERTAPENEM 1 G/1
INJECTION, POWDER, LYOPHILIZED, FOR SOLUTION INTRAMUSCULAR; INTRAVENOUS
Status: DISCONTINUED | OUTPATIENT
Start: 2018-06-18 | End: 2018-06-18

## 2018-06-18 RX ORDER — OXYCODONE AND ACETAMINOPHEN 10; 325 MG/1; MG/1
1 TABLET ORAL EVERY 6 HOURS PRN
Qty: 30 TABLET | Refills: 0 | Status: SHIPPED | OUTPATIENT
Start: 2018-06-18 | End: 2018-06-28

## 2018-06-18 RX ORDER — MEPERIDINE HYDROCHLORIDE 50 MG/ML
INJECTION INTRAMUSCULAR; INTRAVENOUS; SUBCUTANEOUS
Status: DISCONTINUED | OUTPATIENT
Start: 2018-06-18 | End: 2018-06-18

## 2018-06-18 RX ORDER — FAMOTIDINE 10 MG/ML
20 INJECTION INTRAVENOUS ONCE
Status: DISCONTINUED | OUTPATIENT
Start: 2018-06-18 | End: 2018-06-18 | Stop reason: HOSPADM

## 2018-06-18 RX ORDER — ONDANSETRON 2 MG/ML
INJECTION INTRAMUSCULAR; INTRAVENOUS
Status: DISCONTINUED | OUTPATIENT
Start: 2018-06-18 | End: 2018-06-18

## 2018-06-18 RX ORDER — BUPIVACAINE HYDROCHLORIDE AND EPINEPHRINE 5; 5 MG/ML; UG/ML
INJECTION, SOLUTION EPIDURAL; INTRACAUDAL; PERINEURAL
Status: DISCONTINUED | OUTPATIENT
Start: 2018-06-18 | End: 2018-06-18 | Stop reason: HOSPADM

## 2018-06-18 RX ORDER — MORPHINE SULFATE 10 MG/ML
2 INJECTION INTRAMUSCULAR; INTRAVENOUS; SUBCUTANEOUS EVERY 5 MIN PRN
Status: DISCONTINUED | OUTPATIENT
Start: 2018-06-18 | End: 2018-06-18 | Stop reason: HOSPADM

## 2018-06-18 RX ADMIN — MIDAZOLAM HYDROCHLORIDE 2 MG: 1 INJECTION, SOLUTION INTRAMUSCULAR; INTRAVENOUS at 10:06

## 2018-06-18 RX ADMIN — MORPHINE SULFATE 2 MG: 10 INJECTION INTRAVENOUS at 01:06

## 2018-06-18 RX ADMIN — SUCCINYLCHOLINE CHLORIDE 120 MG: 20 INJECTION, SOLUTION INTRAMUSCULAR; INTRAVENOUS at 10:06

## 2018-06-18 RX ADMIN — SODIUM CHLORIDE: 9 INJECTION, SOLUTION INTRAVENOUS at 10:06

## 2018-06-18 RX ADMIN — SODIUM CHLORIDE: 9 INJECTION, SOLUTION INTRAVENOUS at 11:06

## 2018-06-18 RX ADMIN — GLYCOPYRROLATE 0.2 MG: 0.2 INJECTION INTRAMUSCULAR; INTRAVENOUS at 11:06

## 2018-06-18 RX ADMIN — ONDANSETRON 4 MG: 2 INJECTION INTRAMUSCULAR; INTRAVENOUS at 11:06

## 2018-06-18 RX ADMIN — MEPERIDINE HYDROCHLORIDE 50 MG: 50 INJECTION INTRAMUSCULAR; INTRAVENOUS; SUBCUTANEOUS at 11:06

## 2018-06-18 RX ADMIN — CISATRACURIUM BESYLATE 4 MG: 2 INJECTION INTRAVENOUS at 11:06

## 2018-06-18 RX ADMIN — ERTAPENEM SODIUM 1 G: 1 INJECTION, POWDER, LYOPHILIZED, FOR SOLUTION INTRAMUSCULAR; INTRAVENOUS at 11:06

## 2018-06-18 RX ADMIN — GLYCOPYRROLATE 0.4 MG: 0.2 INJECTION INTRAMUSCULAR; INTRAVENOUS at 12:06

## 2018-06-18 RX ADMIN — PROPOFOL 150 MG: 10 INJECTION, EMULSION INTRAVENOUS at 10:06

## 2018-06-18 RX ADMIN — NEOSTIGMINE METHYLSULFATE 3 MG: 1 INJECTION INTRAVENOUS at 12:06

## 2018-06-18 NOTE — OP NOTE
[facility] - Periop Services  Operative Note     SUMMARY     Surgery Date: 6/18/2018     Surgeon(s) and Role:     * Adams Cole MD - Assisting     * Deven Boyd MD - Primary    Assistant: Erasmo    Pre-op Diagnosis:  Symptomatic cholelithiasis [K80.20]    Post-op Diagnosis:  Post-Op Diagnosis Codes:     * Symptomatic cholelithiasis [K80.20]    Operation:  1) Laparoscopic Cholecystectomy  2) Laparoscopic non-anatomic Liver Wedge Resection Segment 4  3) Reducible Umbilical hernia repair    Description of the findings of the procedure:  Symptomatic cholelithiasis [K80.20]    Estimated Blood Loss: 10cc         Specimens:   Specimen (12h ago through future)    Start     Ordered    06/18/18 1155  Specimen to Pathology - Surgery  Once     Comments:  Pre-op Diagnosis: Symptomatic cholelithiasis [K80.20]Post-op Diagnosis: SAME, NON ALCOHOLIC STEATOHEPATITISProcedure(s):CHOLECYSTECTOMY-LAPAROSCOPICBIOPSY, LIVER, LAPAROSCOPIC Number of specimens: 2Name of specimens: 1. GALLBLADDER 2. WEDGE RESECTION LIVER BX-SECTION 4      06/18/18 1156        Abx: Invanz 1 gram    Anesthesia: GETA    Indications for procedure    Ms Suresh is a 62 year old female who presented through clinic with evidence of abdominal pain. Patient underwent double endoscopy which showed gastritis and duodenitis however no evidence of Helicobacter pylori species.  Previously she had undergone MRI of her spine which showed cholelithiasis.  Patient continued postoperative of EGD to have abdominal pain in spite of Protonix and Carafate therapy.  Risk and benefits of laparoscopic cholecystectomy for symptomatic cholelithiasis were discussed in depth in clinic, and the patient wished to proceed today by signing informed consent.    Procedure in detail    The patient was brought back into the Operative Room,  placed on table in supine position.  General anesthesia was introduced via  the endotracheal tube.  A timeout was then  conducted with all in the  Operating Room in agreement, correct patient, correct procedure, correct  allergies and correct antibiotics.  The patient was then given 1 g Invanz  IV.  The patient's abdomen was then prepped and draped in the standard  sterile surgical fashion.    I then made a infraumbilical incision in a curvilinear fashion.   Infraumbilical incision was carried down to the fascia with Bovie  electrocautery.  Towel clips were used to elevate the umbilical stalk.  The  fascia was entered horizontally at the level of the umbilical hernia. Finger sweep was done of the anterior abdominal wall and determined to be devoid of adhesions.  Mari trocar was placed.  The abdomen was then  insufflated to 15 mmHg through the Mari trocar without evidence of  bradycardia episodes of the patient.  The patient was then placed in a head  up, left lateral position.    I then placed three additional trocars in the patient's right upper  quadrant under direct visualization.  These were 5-mm trocars.  Inspection of the liver revealed early cirrhosis with what appeared to be non alcoholic steatohepatitis changes.  I at this point call my partner in Dr. Cole for a 2nd opinion.  He agreed with the diagnosis of DAVEY based upon physical appearance of the liver. He recommended liver biopsy.  He at this point scrubbed in and remained scrubbed into the remainder of the case.  I then  placed the assistant's port on the dome of the gallbladder and retracted it  superiorly. At this point, I  then placed my retractor on the infundibulum of the gallbladder and  retracted it inferolaterally.  The cystic duct structures were then bluntly  dissected out with the [place] retractor.  Cystic duct and cystic artery  were visualized.  Cystic artery was behind the node of Calot.  The cystic  duct and cystic artery were cleaned off and a critical view of safety was  achieved with the liver seen behind both structures and in between  both  structures.  Two clips were placed proximally on both structures, one clip  distally.  Both structures were transected distally.  The gallbladder was  then retracted with adequate tension to allow for the Bovie electrocautery  to remove the gallbladder from the liver bed. The gallbladder was not entered.  Bile was not spilled.  The gallbladder was then dissected off, bovied off  the liver bed, it was placed in an EndoCatch bag.  EndoCatch bag was then  removed through the Mari trocar. The gallbladder was handed off for permanent  section.  The liver was then retracted superiorly with a grasper.  A spot on the periphery of segment 4 of the liver was chosen for liver wedge resection for biopsy purposes.  A Harmonic was used to make a wedge resection of the liver at this spot on the periphery of the gallbladder fossa.  The liver wedge resection was placed in an Endo-Catch bag and pulled out through the Quigley trocar and handed off as specimen for permanent section of liver wedge resection segment 4.  The  gallbladder bed was washed out copiously with  irrigant.  Hemostasis was  achieved in the gallbladder bed.  The clips placed previously on cystic  duct and cystic artery were visualized again.  There was no hemorrhage from  the artery.  There was no bile spillage from the cystic duct.  The patient  was then placed in a back to normal position, in the supine position.  The  right upper quadrant was irrigated out copiously with  irrigant until  clear fluid returned.  Three 5-mm trocars were removed under direct  visualization.  There was no hemorrhage from the port sites.  Insufflation  was released.  A Mari trocar was then removed.    Attention was then turned towards repair of the umbilical hernia.  The umbilical stalk was transected from the fascia underneath.  The umbilical hernia site was determined to be approximately 2 cm wide.  Kocher clamps placed laterally in the fascia was elevated.  0 Ethibond  sutures were used in a simple interrupted fashion to close the umbilical hernia. Approximately 6 sutures were used.  The umbilical stalk was then tacked back down to the fascia with a 3 0 Vicryl suture.   At this point, the umbilical site  was washed out with  irrigant.  3-0 Vicryl sutures were then used in a  simple subdermal fashion to close the subdermis at all sites.  The skin was  then run with a 4-0 Vicryl suture in a running subcuticular fashion at the  umbilical site.  Dermabond was placed over all four dressing.  The patient  was then reversed from general anesthesia, extubated in the OR, transferred  back to the Postoperative Care Unit in stable condition.  All counts were  correct at the end of the procedure including lap pads, instruments as well  as needles.

## 2018-06-18 NOTE — ANESTHESIA PREPROCEDURE EVALUATION
06/18/2018  Claudia Suresh is a 62 y.o., female.    Anesthesia Evaluation    I have reviewed the Patient Summary Reports.    I have reviewed the Nursing Notes.   I have reviewed the Medications.     Review of Systems  Anesthesia Hx:  No problems with previous Anesthesia    Social:  Smoker, Former Smoker    Cardiovascular:   Hypertension    Musculoskeletal:  Spine Disorders: cervical        Physical Exam  General:  Well nourished    Airway/Jaw/Neck:  Airway Findings: Mouth Opening: Normal Tongue: Normal  Mallampati: II  Improves to II with phonation.  Jaw/Neck Findings:  Neck ROM: Normal ROM      Dental:  Dental Findings: Upper Dentures, Lower Dentures   Chest/Lungs:  Chest/Lungs Findings: Clear to auscultation     Heart/Vascular:  Heart Findings: Rate: Normal  Rhythm: Regular Rhythm             Anesthesia Plan  Type of Anesthesia, risks & benefits discussed:  Anesthesia Type:  general  Patient's Preference:   Intra-op Monitoring Plan: standard ASA monitors  Intra-op Monitoring Plan Comments:   Post Op Pain Control Plan: IV/PO Opioids PRN  Post Op Pain Control Plan Comments:   Induction:   IV  Beta Blocker:  Patient is not currently on a Beta-Blocker (No further documentation required).       Informed Consent: Patient understands risks and agrees with Anesthesia plan.  Questions answered. Anesthesia consent signed with patient.  ASA Score: 3     Day of Surgery Review of History & Physical: I have interviewed and examined the patient. I have reviewed the patient's H&P dated:            Ready For Surgery From Anesthesia Perspective.

## 2018-06-18 NOTE — BRIEF OP NOTE
[facility] - Periop Services  Brief Operative Note     SUMMARY     Surgery Date: 6/18/2018     Surgeon(s) and Role:     * Adams Cole MD - Assisting     * Deven Boyd MD - Primary    Assistant: Erasmo    Pre-op Diagnosis:  Symptomatic cholelithiasis [K80.20]    Post-op Diagnosis:  Post-Op Diagnosis Codes:     * Symptomatic cholelithiasis [K80.20]    Operation:  1) Laparoscopic Cholecystectomy  2) Laparoscopic non-anatomic Liver Wedge Resection Segment 4  3) Reducible Umbilical hernia repair    Description of the findings of the procedure:  Symptomatic cholelithiasis [K80.20]    Estimated Blood Loss: 10cc         Specimens:   Specimen (12h ago through future)    Start     Ordered    06/18/18 1155  Specimen to Pathology - Surgery  Once     Comments:  Pre-op Diagnosis: Symptomatic cholelithiasis [K80.20]Post-op Diagnosis: SAME, NON ALCOHOLIC STEATOHEPATITISProcedure(s):CHOLECYSTECTOMY-LAPAROSCOPICBIOPSY, LIVER, LAPAROSCOPIC Number of specimens: 2Name of specimens: 1. GALLBLADDER 2. WEDGE RESECTION LIVER BX-SECTION 4      06/18/18 1156        Abx: Invanz 1 gram    Anesthesia: GETA

## 2018-06-18 NOTE — TRANSFER OF CARE
"Anesthesia Transfer of Care Note    Patient: Claudia Suresh    Procedure(s) Performed: Procedure(s) (LRB):  CHOLECYSTECTOMY-LAPAROSCOPIC (Bilateral)  BIOPSY, LIVER, LAPAROSCOPIC (N/A)    Patient location: PACU    Anesthesia Type: general    Transport from OR: Transported from OR on room air with adequate spontaneous ventilation    Post pain: adequate analgesia    Post assessment: no apparent anesthetic complications and tolerated procedure well    Post vital signs: stable    Level of consciousness: awake, alert and oriented    Nausea/Vomiting: no nausea/vomiting    Complications: none    Transfer of care protocol was followed      Last vitals:   Visit Vitals  BP (!) 143/84 (BP Location: Right arm, Patient Position: Lying)   Pulse (!) 54   Temp 36.6 °C (97.9 °F) (Oral)   Resp 18   Ht 5' 1" (1.549 m)   Wt 55.3 kg (122 lb)   SpO2 100%   Breastfeeding? No   BMI 23.05 kg/m²     "

## 2018-06-18 NOTE — ANESTHESIA POSTPROCEDURE EVALUATION
"Anesthesia Post Evaluation    Patient: Claudia Suresh    Procedure(s) Performed: Procedure(s) (LRB):  CHOLECYSTECTOMY-LAPAROSCOPIC (Bilateral)  BIOPSY, LIVER, LAPAROSCOPIC (N/A)    Final Anesthesia Type: general  Patient location during evaluation: PACU  Patient participation: Yes- Able to Participate  Level of consciousness: awake and alert  Post-procedure vital signs: reviewed and stable  Pain management: adequate  Airway patency: patent  PONV status at discharge: No PONV  Anesthetic complications: no      Cardiovascular status: blood pressure returned to baseline  Respiratory status: unassisted  Hydration status: euvolemic  Follow-up not needed.        Visit Vitals  /75   Pulse (!) 56   Temp 36.6 °C (97.9 °F) (Oral)   Resp 10   Ht 5' 1" (1.549 m)   Wt 55.3 kg (122 lb)   SpO2 95%   Breastfeeding? No   BMI 23.05 kg/m²       Pain/Sherrie Score: Pain Assessment Performed: Yes (6/18/2018  9:43 AM)  Presence of Pain: complains of pain/discomfort (6/18/2018  1:00 PM)  Pain Rating Prior to Med Admin: 8 (6/18/2018  1:14 PM)  Sherrie Score: 10 (6/18/2018  1:00 PM)      "

## 2018-06-18 NOTE — DISCHARGE INSTRUCTIONS
Cholecystectomy     Clips close off the duct connecting the gallbladder to the bile duct. The gallbladder is then removed.     Youve had painful attacks caused by gallstones. To treat the problem, your healthcare provider wants to remove your gallbladder. This surgery is called cholecystectomy. Removing the gallbladder can relieve pain. It will also prevent future attacks. You can live a healthy life without your gallbladder. You may also be able to go back to eating foods you enjoyed before your gallbladder problems started.  Before your surgery  Be prepared:  · Tell your provider what medicines you take. Include those bought over the counter. Also include herbs or supplements. Be sure to mention if you take prescription blood thinners. This includes warfarin, clopidogrel, and aspirin.  · Have any tests your provider asks for, such as blood tests.  · Dont eat or drink after midnight, the night before your surgery. This includes water, coffee, and mints. However, you may need to take some medicine with sips of water--talk with your healthcare provider.  The day of surgery  When you arrive, you will prepare for surgery:  · An IV line will be put into a vein in your arm or hand. This gives you fluids and medicine.  · An anesthesiologist will talk with you about anesthesia. This is medicine used to prevent pain. You will receive general anesthesia. This puts you into a state like deep sleep through the procedure.  During surgery  There are 2 methods for removing the gallbladder. Your healthcare provider will choose which method is best for you:  · Laparoscopic cholecystectomy. This is most common. During surgery, 2 to 4 small incisions are made. A thin tube with a camera is used. This is called a laparoscope. The scope is put through one of the incisions. It sends images to a video screen. Surgical tools are put through other incisions. The gallbladder is removed using the scope and these tools.  · Open  cholecystectomy. One larger incision is made. The surgeon sees and works through this incision. Open surgery is most often used when scarring or other factors make it a better choice for you.  In some cases, safety requires a change from laparoscopic to open surgery during the procedure.  After surgery  You will be sent to a room to wake up from the anesthesia. You will likely go home the same day. In some cases, an overnight stay is needed. If you had open cholecystectomy, you may need to stay in the hospital for a few days. When you are released to go home, have a family member or friend ready to drive you.  Risks and possible complications of gallbladder surgery  All surgeries have risks. The risks of gallbladder surgery include:  · Bleeding  · Infection  · Injury to the common bile duct or nearby organs  · Blood clots in the legs  · Bile leaks  · Hernia at incision site  · Pnemonia   Date Last Reviewed: 7/1/2016  © 0699-4167 [employer]. [address] [place] [address]. All rights reserved. This information is not intended as a substitute for professional medical care. Always follow your healthcare professional's instructions.        Discharge Instructions: After Your Surgery  Youve just had surgery. During surgery, you were given medicine called anesthesia to keep you relaxed and free of pain. After surgery, you may have some pain or nausea. This is common. Here are some tips for feeling better and getting well after surgery.     Stay on schedule with your medicine.   Going home  Your healthcare provider will show you how to take care of yourself when you go home. He or she will also answer your questions. Have an adult family member or friend drive you home. For the first 24 hours after your surgery:  · Do not drive or use heavy equipment.  · Do not make important decisions or sign legal papers.  · Do not drink alcohol.  · Have someone stay with you, if needed. He or she can watch for  problems and help keep you safe.  Be sure to go to all follow-up visits with your healthcare provider. And rest after your surgery for as long as your healthcare provider tells you to.  Coping with pain  If you have pain after surgery, pain medicine will help you feel better. Take it as told, before pain becomes severe. Also, ask your healthcare provider or pharmacist about other ways to control pain. This might be with heat, ice, or relaxation. And follow any other instructions your surgeon or nurse gives you.  Tips for taking pain medicine  To get the best relief possible, remember these points:  · Pain medicines can upset your stomach. Taking them with a little food may help.  · Most pain relievers taken by mouth need at least 20 to 30 minutes to start to work.  · Taking medicine on a schedule can help you remember to take it. Try to time your medicine so that you can take it before starting an activity. This might be before you get dressed, go for a walk, or sit down for dinner.  · Constipation is a common side effect of pain medicines. Call your healthcare provider before taking any medicines such as laxatives or stool softeners to help ease constipation. Also ask if you should skip any foods. Drinking lots of fluids and eating foods such as fruits and vegetables that are high in fiber can also help. Remember, do not take laxatives unless your surgeon has prescribed them.  · Drinking alcohol and taking pain medicine can cause dizziness and slow your breathing. It can even be deadly. Do not drink alcohol while taking pain medicine.  · Pain medicine can make you react more slowly to things. Do not drive or run machinery while taking pain medicine.  Your healthcare provider may tell you to take acetaminophen to help ease your pain. Ask him or her how much you are supposed to take each day. Acetaminophen or other pain relievers may interact with your prescription medicines or other over-the-counter (OTC) medicines.  Some prescription medicines have acetaminophen and other ingredients. Using both prescription and OTC acetaminophen for pain can cause you to overdose. Read the labels on your OTC medicines with care. This will help you to clearly know the list of ingredients, how much to take, and any warnings. It may also help you not take too much acetaminophen. If you have questions or do not understand the information, ask your pharmacist or healthcare provider to explain it to you before you take the OTC medicine.  Managing nausea  Some people have an upset stomach after surgery. This is often because of anesthesia, pain, or pain medicine, or the stress of surgery. These tips will help you handle nausea and eat healthy foods as you get better. If you were on a special food plan before surgery, ask your healthcare provider if you should follow it while you get better. These tips may help:  · Do not push yourself to eat. Your body will tell you when to eat and how much.  · Start off with clear liquids and soup. They are easier to digest.  · Next try semi-solid foods, such as mashed potatoes, applesauce, and gelatin, as you feel ready.  · Slowly move to solid foods. Dont eat fatty, rich, or spicy foods at first.  · Do not force yourself to have 3 large meals a day. Instead eat smaller amounts more often.  · Take pain medicines with a small amount of solid food, such as crackers or toast, to avoid nausea.     Call your surgeon if  · You still have pain an hour after taking medicine. The medicine may not be strong enough.  · You feel too sleepy, dizzy, or groggy. The medicine may be too strong.  · You have side effects like nausea, vomiting, or skin changes, such as rash, itching, or hives.       If you have obstructive sleep apnea  You were given anesthesia medicine during surgery to keep you comfortable and free of pain. After surgery, you may have more apnea spells because of this medicine and other medicines you were given.  The spells may last longer than usual.   At home:  · Keep using the continuous positive airway pressure (CPAP) device when you sleep. Unless your healthcare provider tells you not to, use it when you sleep, day or night. CPAP is a common device used to treat obstructive sleep apnea.  · Talk with your provider before taking any pain medicine, muscle relaxants, or sedatives. Your provider will tell you about the possible dangers of taking these medicines.  Date Last Reviewed: 12/1/2016  © 2181-5918 [employer]. [address] [place] [address], [place] [address]. All rights reserved. This information is not intended as a substitute for professional medical care. Always follow your healthcare professional's instructions.

## 2018-06-18 NOTE — H&P (VIEW-ONLY)
[facility] Surgery H&P Note    Subjective:       Patient ID: Claudia Suresh is a 62 y.o. female.    Chief Complaint: Follow-up (2 weeks Gastritis)    HPI:  Claudia Suresh is a 62 y.o. 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 Patient is underwent EGD which showed mild chronic gastritis.  She has been treated with Protonix as well as Carafate with no resolution.  Patient continues to endorse epigastric/right upper quadrant pain, bloating most notably after coffee with cream ran taken the morning.  No other aggravating or alleviating factors.  Patient recently underwent ultrasound and HIDA scan which were negative however patient previously will underwent MRI back in the fall which showed cholelithiasis.  Patient reports classic symptoms of symptomatic cholelithiasis.  Symptoms 6/10 when they occur.  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    Essential hypertension     Tobacco abuse 1/7/2016     Past Surgical History:   Procedure Laterality Date    COLONOSCOPY  12/06/2013    COLONOSCOPY N/A 5/1/2018    Procedure: COLONOSCOPY;  Surgeon: Deven Boyd MD;  Location: [facility];  Service: Endoscopy;  Laterality: N/A;    ESOPHAGOGASTRODUODENOSCOPY  12/06/2013    NECK SURGERY      none       Family History   Problem Relation Age of Onset    Stroke Mother     Ovarian cancer Mother     Cancer Father      Social History     Social History    Marital status:      Spouse name: N/A    Number of children: N/A    Years of education: N/A     Social History Main Topics    Smoking status: Current Every Day Smoker     Packs/day: 0.50     Types: Cigarettes    Smokeless tobacco: Never Used    Alcohol use  Yes      Comment: social    Drug use: No    Sexual activity: Yes     Partners: Male     Other Topics Concern    None     Social History Narrative    None       Current Outpatient Prescriptions   Medication Sig Dispense Refill    gabapentin (NEURONTIN) 300 MG capsule TAKE ONE CAPSULE BY MOUTH THREE TIMES DAILY 90 capsule 0    oxycodone-acetaminophen (PERCOCET) 5-325 mg per tablet Take 1 tablet by mouth every 4 (four) hours as needed for Pain. 90 tablet 0    pantoprazole (PROTONIX) 40 MG tablet Take 1 tablet (40 mg total) by mouth once daily. 30 tablet 3    spironolactone (ALDACTONE) 25 MG tablet Take 25 mg by mouth 2 (two) times daily.      sucralfate (CARAFATE) 1 gram tablet Take 1 tablet (1 g total) by mouth 4 (four) times daily. 120 tablet 0     Current Facility-Administered Medications   Medication Dose Route Frequency Provider Last Rate Last Dose    ceFOXItin (MEFOXIN) 2 g in dextrose 5 % 100 mL IVPB  2 g Intravenous On Call Procedure Deven Boyd MD         Review of patient's allergies indicates:  No Known Allergies    Review of Systems   Constitutional: Negative for activity change, appetite change, chills and fever.   HENT: Negative for congestion, dental problem and ear discharge.    Eyes: Negative for discharge and itching.   Respiratory: Negative for apnea, choking and chest tightness.    Cardiovascular: Negative for chest pain and leg swelling.   Gastrointestinal: Positive for abdominal distention and abdominal pain. Negative for anal bleeding, constipation, diarrhea and nausea.   Endocrine: Negative for cold intolerance and heat intolerance.   Genitourinary: Negative for difficulty urinating and dyspareunia.   Musculoskeletal: Negative for arthralgias and back pain.   Skin: Negative for color change and pallor.   Neurological: Negative for dizziness and facial asymmetry.   Hematological: Negative for adenopathy. Does not bruise/bleed easily.   Psychiatric/Behavioral: Negative for  agitation and behavioral problems.       Objective:      Vitals:    05/30/18 0849   BP: (!) 148/80   BP Location: Right arm   Patient Position: Sitting   Pulse: (!) 58   Resp: 18   Temp: 97.5 °F (36.4 °C)   TempSrc: Oral   SpO2: 96%   Weight: 55.8 kg (123 lb)   Height: 5' (1.524 m)     Physical Exam   Constitutional: She is oriented to person, place, and time. She appears well-developed and well-nourished. No distress.   HENT:   Head: Normocephalic and atraumatic.   Eyes: EOM are normal. Pupils are equal, round, and reactive to light.   Neck: Normal range of motion. Neck supple. No thyromegaly present.   Cardiovascular: Normal rate and regular rhythm.    Pulmonary/Chest: Effort normal and breath sounds normal.   Abdominal: Soft. Bowel sounds are normal. She exhibits no distension. There is no tenderness.       Musculoskeletal: Normal range of motion. She exhibits no edema or deformity.   Neurological: She is alert and oriented to person, place, and time. No cranial nerve deficit.   Skin: Skin is warm. Capillary refill takes less than 2 seconds. No erythema.   Psychiatric: She has a normal mood and affect. Her behavior is normal.       Lab Review:   CBC:   Lab Results   Component Value Date    WBC 5.61 04/27/2018    RBC 4.66 04/27/2018    HGB 14.4 04/27/2018    HCT 44.2 04/27/2018     04/27/2018     BMP:   Lab Results   Component Value Date     04/27/2018     04/27/2018    K 4.1 04/27/2018     04/27/2018    CO2 25 04/27/2018    BUN 8 04/27/2018    CREATININE 0.5 04/27/2018    CALCIUM 9.9 04/27/2018     Diagnostics Review: ECG: Reviewed   Previous US and HIDA scan reviewed.  Previous MRI reviewed to show stones.     Assessment:       1. Symptomatic cholelithiasis        Plan:   Symptomatic cholelithiasis  -     Case Request Operating Room: CHOLECYSTECTOMY-LAPAROSCOPIC  -     Place in Outpatient; Standing  -     Vital signs; Standing  -     Insert peripheral IV; Standing  -     Verify  beta-blocker dose taken within 24 hours if patient is prescribed beta-blocker; Standing  -     Height and weight; Standing  -     Intake and output; Standing  -     Verify discontinuation of antithrombotics; Standing  -     POCT glucose; Standing  -     Verify blood consent; Standing  -     Verify consent; Standing  -     Clip and Prep Abdomen Zyphoid to Pubis; Standing  -     Chlorhexidine (CHG) 2% Wipes; Standing  -     Hibiclens shower; Standing  -     Hibiclens shower; Standing  -     Notify Physician; Standing  -     Diet NPO; Standing  -     Early ambulation; Standing  -     Place SAMUEL hose; Standing  -     Place sequential compression device; Standing  -     Pulse Oximetry Q4H; Standing  -     Comprehensive metabolic panel; Standing    Other orders  -     0.9%  NaCl infusion; Inject into the vein continuous.  -     IP VTE LOW RISK PATIENT; Standing  -     ceFOXItin (MEFOXIN) 2 g in dextrose 5 % 100 mL IVPB; Inject 2 g into the vein On call Procedure (surgery).        Medical Decision Making/Counseling:  Patient has symptoms of symptomatic stone disease.  I believe the patient would benefit from a cholecystectomy.  She has been given 2 weeks of Carafate with Protonix to help with her gastritis with no resolution of symptoms.  Will proceed with laparoscopic versus open cholecystectomy.    I have reviewed the patient's history physical examination as well as the patient's laboratory and radiologic studies.  She has what appears to be symptomatic cholelithiasis.  Postprandial pain which is colicky in nature.  Patient on ultrasound shows cholelithiasis, no evidence of cholecystitis.  I believe given the patient's symptomatology she would benefit from an elective laparoscopic versus open cholecystectomy.  Today in clinic we had a counseling session which lasted 30-40 minutes.  Counseling involved risk and benefits discussion.  Risks of cholecystectomy were discussed in detail.  I discussed that there is a 1 and 200  chance (0.5%) common bile duct injury.  I discussed the common bile duct is the drainage tube of the liver.  I discussed and such patients with cholecystectomy which results in injury of the common bile duct, a larger surgery is required called a hepaticojejunostomy.  I further discussed the risk of conversion to an open operation.  I discussed that 100% of the time I start cholecystectomies laparoscopically in the elective setting, but only 95% of the time am I able to complete the cholecystectomy laparoscopically.  I discussed that 5% of the time in open (larger incision) surgery is required for the safety of the patient.  I discussed the reasons for conversion to an open operation (how cholecystectomies use to be always removed) included significant scarring, adhesions, bleeding, or concern for injury of the surrounding structures which needed repair etc.  I also discussed with the patient that the intrinsic risks of surgery include bleeding, infection, need for further surgeries, admission to the hospital, in the intrinsic risks of anesthesia for which the patient will have a discussion on the surgical date with the anesthesiologist about.  With this discussion, I drew the patient a picture to assist with her understanding.  At the end of the discussion the patient understood the risks and wished to proceed in the near future.

## 2018-06-18 NOTE — DISCHARGE SUMMARY
Discharge Note        SUMMARY     Admit Date: 6/18/2018    Attending Physician: Deven Boyd MD     Discharge Physician: Deven Boyd MD    Discharge Date: 6/18/2018 12:31 PM      Hospital Course: Patient tolerated procedure well.     Disposition: Home or Self Care    Patient Instructions:   Current Discharge Medication List      START taking these medications    Details   docusate sodium (COLACE) 100 MG capsule Take 1 capsule (100 mg total) by mouth once daily.  Qty: 30 capsule, Refills: 0      !! ondansetron (ZOFRAN) 4 MG tablet Take 1 tablet (4 mg total) by mouth every 6 (six) hours as needed for Nausea.  Qty: 30 tablet, Refills: 0      oxyCODONE-acetaminophen (PERCOCET)  mg per tablet Take 1 tablet by mouth every 6 (six) hours as needed for Pain.  Qty: 30 tablet, Refills: 0       !! - Potential duplicate medications found. Please discuss with provider.      CONTINUE these medications which have NOT CHANGED    Details   gabapentin (NEURONTIN) 300 MG capsule TAKE ONE CAPSULE BY MOUTH THREE TIMES DAILY  Qty: 90 capsule, Refills: 0    Associated Diagnoses: Status post cervical spinal fusion      !! ondansetron (ZOFRAN) 4 MG tablet Take 1 tablet (4 mg total) by mouth every 6 (six) hours as needed for Nausea.  Qty: 30 tablet, Refills: 0    Associated Diagnoses: Postop check      pantoprazole (PROTONIX) 40 MG tablet Take 1 tablet (40 mg total) by mouth once daily.  Qty: 30 tablet, Refills: 3    Associated Diagnoses: Chronic superficial gastritis without bleeding      spironolactone (ALDACTONE) 25 MG tablet Take 25 mg by mouth 2 (two) times daily.      sucralfate (CARAFATE) 1 gram tablet Take 1 tablet (1 g total) by mouth 4 (four) times daily.  Qty: 120 tablet, Refills: 0    Associated Diagnoses: Chronic superficial gastritis without bleeding       !! - Potential duplicate medications found. Please discuss with provider.      STOP taking these medications       oxyCODONE-acetaminophen  (PERCOCET) 5-325 mg per tablet Comments:   Reason for Stopping:               Discharge Procedure Orders (must include Diet, Follow-up, Activity):    Discharge Procedure Orders (must include Diet, Follow-up, Activity)  Diet general     Activity as tolerated     Call MD for:  temperature >100.4     Call MD for:  persistent nausea and vomiting     Call MD for:  severe uncontrolled pain     Call MD for:  difficulty breathing, headache or visual disturbances     Call MD for:  redness, tenderness, or signs of infection (pain, swelling, redness, odor or green/yellow discharge around incision site)     Call MD for:  persistent dizziness or light-headedness     Remove dressing in 48 hours          Follow Up:  Follow up as scheduled.  Resume routine diet.  Activity as tolerated.    No driving day of procedure.

## 2018-06-21 ENCOUNTER — TELEPHONE (OUTPATIENT)
Dept: FAMILY MEDICINE | Facility: CLINIC | Age: 63
End: 2018-06-21

## 2018-06-21 NOTE — TELEPHONE ENCOUNTER
"  Spoke to patient. She states, " someone has already changed this in the computer for me.  Thank you." No other concerns voiced at this time.    Return call.  No answer. Left message for return call.      ----- Message from Tammy Perez sent at 6/21/2018  9:19 AM CDT -----  Patient requesting to speak with nurse concerning switching appointment date with spouse on July 6th/patient has appointment on July 20th/please call patient back at [phone] to advise.    "

## 2018-06-27 NOTE — PHYSICIAN QUERY
PT Name: Claudia Suresh  MR #: [id number]     Physician Query Form - Documentation Clarification      CDS/: Diann Parry               Contact information:[email or web address]    This form is a permanent document in the medical record.     Query Date: June 27, 2018    By submitting this query, we are merely seeking further clarification of documentation. Please utilize your independent clinical judgment when addressing the question(s) below.    The Medical record reflects the following:    Supporting Clinical Findings Location in Medical Record   2. Liver, section 4, wedge biopsy:  - Minimal steatosis, predominantly macrovesicular, less than 5%.  - Bile duct hematoma     Path report                                                                                      Doctor, Please specify if the bile duct hematoma, per the path report, was an intraoperative injury.    Provider Use Only           This is not an intra-operative injury.  They are referring to within the pathology liver specimen not the Common bile duct.  This is a normal pathology report from a liver biopsy.                                                                                                                     [  ] Clinically undetermined

## 2018-06-29 ENCOUNTER — OFFICE VISIT (OUTPATIENT)
Dept: SURGERY | Facility: CLINIC | Age: 63
End: 2018-06-29
Payer: COMMERCIAL

## 2018-06-29 VITALS
SYSTOLIC BLOOD PRESSURE: 136 MMHG | TEMPERATURE: 98 F | RESPIRATION RATE: 18 BRPM | DIASTOLIC BLOOD PRESSURE: 82 MMHG | OXYGEN SATURATION: 95 % | WEIGHT: 121 LBS | HEIGHT: 60 IN | BODY MASS INDEX: 23.75 KG/M2 | HEART RATE: 69 BPM

## 2018-06-29 DIAGNOSIS — Z09 POSTOP CHECK: Primary | ICD-10-CM

## 2018-06-29 PROCEDURE — 99024 POSTOP FOLLOW-UP VISIT: CPT | Mod: S$GLB,,, | Performed by: SURGERY

## 2018-06-29 NOTE — PROGRESS NOTES
General Surgery  [facility]  Follow-up    HPI/Follow-up exam:  Claudia Suresh is a 62 y.o. female status post laparoscopic cholecystectomy.  Patient is doing very well since surgery. No issues.  Liver biopsy reviewed with patient showed mild fibrosis.  No evidence of cirrhosis.  She presents today for follow-up examination.    PHYSICAL EXAM:  /82 (BP Location: Left arm, Patient Position: Sitting)   Pulse 69   Temp 98 °F (36.7 °C) (Oral)   Resp 18   Ht 5' (1.524 m)   Wt 54.9 kg (121 lb)   SpO2 95%   BMI 23.63 kg/m²   Gen: Wd Wn female in NAD  Heent: Nc/At, MMM  Cv: RRR  Lung: Non-labored breathing, clear bilaterally  Abd: Soft, non-tender, non-distended, surgical sites clean dry intact no signs or symptoms of infection.  Ext: No cyanosis clubbing or edema    Pathology reviewed with the patient today and she was given a copy.    Assessment:  Claudia Suresh is a 62 y.o. female s/p laparoscopic cholecystectomy with liver biopsy.    Plan/Medical Decision Making:  Patient doing well the postoperative phase.  And pain significantly improved.  Satisfactory postsurgical recovery.  Follow up with surgery p.r.n.    No Follow-up on file.

## 2018-07-06 ENCOUNTER — OFFICE VISIT (OUTPATIENT)
Dept: FAMILY MEDICINE | Facility: CLINIC | Age: 63
End: 2018-07-06
Payer: MEDICARE

## 2018-07-06 VITALS
OXYGEN SATURATION: 97 % | RESPIRATION RATE: 18 BRPM | SYSTOLIC BLOOD PRESSURE: 141 MMHG | HEART RATE: 53 BPM | BODY MASS INDEX: 23.95 KG/M2 | DIASTOLIC BLOOD PRESSURE: 77 MMHG | HEIGHT: 60 IN | TEMPERATURE: 98 F | WEIGHT: 122 LBS

## 2018-07-06 DIAGNOSIS — R60.9 EDEMA, UNSPECIFIED TYPE: Primary | ICD-10-CM

## 2018-07-06 DIAGNOSIS — Z79.899 ENCOUNTER FOR LONG-TERM (CURRENT) USE OF MEDICATIONS: ICD-10-CM

## 2018-07-06 PROCEDURE — 3077F SYST BP >= 140 MM HG: CPT | Mod: CPTII,S$GLB,, | Performed by: FAMILY MEDICINE

## 2018-07-06 PROCEDURE — 99202 OFFICE O/P NEW SF 15 MIN: CPT | Mod: S$GLB,,, | Performed by: FAMILY MEDICINE

## 2018-07-06 PROCEDURE — 3008F BODY MASS INDEX DOCD: CPT | Mod: CPTII,S$GLB,, | Performed by: FAMILY MEDICINE

## 2018-07-06 PROCEDURE — 3078F DIAST BP <80 MM HG: CPT | Mod: CPTII,S$GLB,, | Performed by: FAMILY MEDICINE

## 2018-07-06 RX ORDER — HYDROCHLOROTHIAZIDE 12.5 MG/1
12.5 TABLET ORAL DAILY
Qty: 30 TABLET | Refills: 2 | Status: SHIPPED | OUTPATIENT
Start: 2018-07-06 | End: 2018-09-17 | Stop reason: SDUPTHER

## 2018-07-06 NOTE — PROGRESS NOTES
Subjective:       Patient ID: Claudia Suresh is a 62 y.o. female.    Chief Complaint: Follow-up (medication change)    HPI   Ms. Suresh presents to [facility] care. Recently had her gallbladder removed. Colonoscopy is up to date. Medical history reviewed.    Complains of worsened anxiety due to her  being diagnosed with cancer. Not currently taking anything for anxiety and does not express any desire to start medication for it at this time.    Has been on spironolactone for several years for recurrent fluid in her ankles. No known history of CHF. Denies any history of abnormal potassium levels.    Review of Systems   Constitutional: Negative for chills, fatigue, fever and unexpected weight change.   Respiratory: Negative for cough, chest tightness, shortness of breath and wheezing.    Cardiovascular: Positive for leg swelling. Negative for chest pain and palpitations.   Musculoskeletal: Positive for back pain and neck pain. Negative for arthralgias and myalgias.   Psychiatric/Behavioral: Negative for decreased concentration, dysphoric mood, hallucinations and suicidal ideas. The patient is nervous/anxious.          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    Essential hypertension     Tobacco abuse 1/7/2016     Past Surgical History:   Procedure Laterality Date    CHOLECYSTECTOMY  06/18/2018    COLONOSCOPY  12/06/2013    COLONOSCOPY N/A 5/1/2018    Procedure: COLONOSCOPY;  Surgeon: Deven Boyd MD;  Location: [facility] ENDO;  Service: Endoscopy;  Laterality: N/A;    ESOPHAGOGASTRODUODENOSCOPY  12/06/2013    LAPAROSCOPIC BIOPSY OF LIVER N/A 6/18/2018    Procedure: BIOPSY, LIVER, LAPAROSCOPIC;  Surgeon: Deven Boyd MD;  Location: [facility] OR;  Service: General;  Laterality: N/A;    LAPAROSCOPIC CHOLECYSTECTOMY Bilateral 6/18/2018    Procedure: CHOLECYSTECTOMY-LAPAROSCOPIC;  Surgeon: Deven Boyd MD;   Location: [facility] OR;  Service: General;  Laterality: Bilateral;    NECK SURGERY      C4-5, C5-6 fusion; discectomy     none       Social History     Social History    Marital status:      Spouse name: N/A    Number of children: N/A    Years of education: N/A     Occupational History    Not on file.     Social History Main Topics    Smoking status: Former Smoker     Packs/day: 1.00     Years: 40.00     Types: Cigarettes     Quit date: 2016    Smokeless tobacco: Never Used    Alcohol use Yes      Comment: beer occasional    Drug use: No    Sexual activity: Yes     Partners: Male     Other Topics Concern    Not on file     Social History Narrative    No narrative on file     Family History   Problem Relation Age of Onset    Stroke Mother     Ovarian cancer Mother     Cancer Mother     Cancer Father        Objective:      BP (!) 141/77   Pulse (!) 53   Temp 97.5 °F (36.4 °C) (Tympanic)   Resp 18   Ht 5' (1.524 m)   Wt 55.3 kg (122 lb)   SpO2 97%   BMI 23.83 kg/m²   Physical Exam   Constitutional: She is oriented to person, place, and time. She appears well-developed and well-nourished. No distress.   Eyes: Conjunctivae and EOM are normal. Pupils are equal, round, and reactive to light. No scleral icterus.   Neurological: She is alert and oriented to person, place, and time.   Skin: Skin is warm and dry. No rash noted. She is not diaphoretic.   Psychiatric: She has a normal mood and affect. Her behavior is normal. Judgment and thought content normal.   Vitals reviewed.      Assessment:       1. Edema, unspecified type    2. Encounter for long-term (current) use of medications        Plan:       Edema, unspecified type  -     Stopping spironolactone, as it's causing stomach upset; changing diuretic to hctz; check electrolytes in approx 3 weeks  -     hydroCHLOROthiazide (HYDRODIURIL) 12.5 MG Tab; Take 1 tablet (12.5 mg total) by mouth once daily.  Dispense: 30 tablet; Refill: 2    Encounter  for long-term (current) use of medications  -     Basic metabolic panel; Future; Expected date: 07/27/2018  -     Magnesium; Future; Expected date: 07/27/2018            Risks, benefits, and side effects were discussed with the patient. All questions were answered to the fullest satisfaction of the patient, and pt verbalized understanding and agreement to treatment plan. Pt was to call with any new or worsening symptoms, or present to the ER.

## 2018-07-17 ENCOUNTER — TELEPHONE (OUTPATIENT)
Dept: FAMILY MEDICINE | Facility: CLINIC | Age: 63
End: 2018-07-17

## 2018-07-17 NOTE — TELEPHONE ENCOUNTER
"Pt reports that after she takes her HCTZ she feels lightheaded and has stopped taking this medication due to dropping her SBP in the 90's. Educated patient on medication, side effects, and benefits. She verbalizes understanding. Stated, "I just wanted to let her know that I stopped it and I am not going to get the bloodwork done since I am not taking it".   "

## 2018-07-17 NOTE — TELEPHONE ENCOUNTER
----- Message from Ashley Mullins sent at 7/17/2018  9:06 AM CDT -----  Contact: self  States she cannot take her fluid pills that were prescribed, they make her blood pressure drop too low. Please call back at [phone] (home)       [employer] [address] [place] [address] AT [address]  [address] [place] [address]  Phone: [phone] Fax: [phone]

## 2018-07-23 ENCOUNTER — LAB VISIT (OUTPATIENT)
Dept: LAB | Facility: HOSPITAL | Age: 63
End: 2018-07-23
Attending: FAMILY MEDICINE
Payer: MEDICARE

## 2018-07-23 DIAGNOSIS — Z79.899 NEED FOR PROPHYLACTIC CHEMOTHERAPY: Primary | ICD-10-CM

## 2018-07-23 LAB
ANION GAP SERPL CALC-SCNC: 7 MMOL/L
BUN SERPL-MCNC: 8 MG/DL
CALCIUM SERPL-MCNC: 9.4 MG/DL
CHLORIDE SERPL-SCNC: 101 MMOL/L
CO2 SERPL-SCNC: 28 MMOL/L
CREAT SERPL-MCNC: 0.7 MG/DL
EST. GFR  (AFRICAN AMERICAN): >60 ML/MIN/1.73 M^2
EST. GFR  (NON AFRICAN AMERICAN): >60 ML/MIN/1.73 M^2
GLUCOSE SERPL-MCNC: 89 MG/DL
MAGNESIUM SERPL-MCNC: 1.8 MG/DL
POTASSIUM SERPL-SCNC: 3.4 MMOL/L
SODIUM SERPL-SCNC: 136 MMOL/L

## 2018-07-23 PROCEDURE — 36415 COLL VENOUS BLD VENIPUNCTURE: CPT

## 2018-07-23 PROCEDURE — 83735 ASSAY OF MAGNESIUM: CPT

## 2018-07-23 PROCEDURE — 80048 BASIC METABOLIC PNL TOTAL CA: CPT

## 2018-07-24 ENCOUNTER — TELEPHONE (OUTPATIENT)
Dept: FAMILY MEDICINE | Facility: CLINIC | Age: 63
End: 2018-07-24

## 2018-08-09 ENCOUNTER — OFFICE VISIT (OUTPATIENT)
Dept: SURGERY | Facility: CLINIC | Age: 63
End: 2018-08-09
Payer: MEDICARE

## 2018-08-09 VITALS
OXYGEN SATURATION: 95 % | HEART RATE: 58 BPM | SYSTOLIC BLOOD PRESSURE: 130 MMHG | DIASTOLIC BLOOD PRESSURE: 79 MMHG | TEMPERATURE: 98 F | HEIGHT: 61 IN | BODY MASS INDEX: 22.47 KG/M2 | WEIGHT: 119 LBS

## 2018-08-09 DIAGNOSIS — R10.13 EPIGASTRIC ABDOMINAL PAIN: Primary | ICD-10-CM

## 2018-08-09 DIAGNOSIS — R10.13 EPIGASTRIC ABDOMINAL PAIN: ICD-10-CM

## 2018-08-09 PROCEDURE — 3008F BODY MASS INDEX DOCD: CPT | Mod: CPTII,S$GLB,, | Performed by: SURGERY

## 2018-08-09 PROCEDURE — 3078F DIAST BP <80 MM HG: CPT | Mod: CPTII,S$GLB,, | Performed by: SURGERY

## 2018-08-09 PROCEDURE — 3075F SYST BP GE 130 - 139MM HG: CPT | Mod: CPTII,S$GLB,, | Performed by: SURGERY

## 2018-08-09 PROCEDURE — 99024 POSTOP FOLLOW-UP VISIT: CPT | Mod: S$GLB,,, | Performed by: SURGERY

## 2018-08-12 NOTE — PROGRESS NOTES
[facility] Surgery  Follow-up    Subjective:       Patient ID: Claudia Suresh is a 62 y.o. female.    Chief Complaint: Consult and Abdominal Pain      HPI:  Claudia Suresh is a 62 y.o. female presents today for follow-up examination of abdominal pain.  Patient has undergone laparoscopic cholecystectomy for cholelithiasis which were symptomatic as well as EGD and colonoscopy.  EGD showed mild chronic gastritis without evidence of Helicobacter pylori species.  Patient states that the abdominal pain she experiences is more bloating in her epigastrium.  Feels nauseated after eating.  Feels like she incompletely empties her stomach. She has been on Protonix for some while.  Pain is described as a 3/10 when it occurs.  Most commonly occurs in the morning.  No aggravating factors.  No alleviating factors.  She presents is established patient follow-up examination.    Review of Systems   Constitutional: Negative for activity change, appetite change, chills and fever.   HENT: Negative for congestion, dental problem and ear discharge.    Eyes: Negative for discharge and itching.   Respiratory: Negative for apnea, choking and chest tightness.    Cardiovascular: Negative for chest pain and leg swelling.   Gastrointestinal: Positive for abdominal distention, abdominal pain and nausea. Negative for anal bleeding, constipation and diarrhea.   Endocrine: Negative for cold intolerance and heat intolerance.   Genitourinary: Negative for difficulty urinating and dyspareunia.   Musculoskeletal: Negative for arthralgias and back pain.   Skin: Negative for color change and pallor.   Neurological: Negative for dizziness and facial asymmetry.   Hematological: Negative for adenopathy. Does not bruise/bleed easily.   Psychiatric/Behavioral: Negative for agitation and behavioral problems.       Objective:      Vitals:    08/09/18 1024   BP: 130/79   Pulse: (!) 58   Temp: 98 °F (36.7 °C)   TempSrc: Oral   SpO2: 95%   Weight:  "54 kg (119 lb)   Height: 5' 1" (1.549 m)     Physical Exam   Constitutional: She is oriented to person, place, and time. She appears well-developed and well-nourished. No distress.   HENT:   Head: Normocephalic and atraumatic.   Eyes: EOM are normal. Pupils are equal, round, and reactive to light.   Neck: Normal range of motion. Neck supple. No thyromegaly present.   Cardiovascular: Normal rate and regular rhythm.   Pulmonary/Chest: Effort normal and breath sounds normal.   Abdominal: Soft. Bowel sounds are normal. She exhibits no distension. There is no tenderness.   Musculoskeletal: Normal range of motion. She exhibits no edema or deformity.   Neurological: She is alert and oriented to person, place, and time. No cranial nerve deficit.   Skin: Skin is warm. Capillary refill takes less than 2 seconds. No erythema.   Psychiatric: She has a normal mood and affect. Her behavior is normal.       Lab Review:   CBC:   Lab Results   Component Value Date    WBC 5.61 04/27/2018    RBC 4.66 04/27/2018    HGB 14.4 04/27/2018    HCT 44.2 04/27/2018     04/27/2018     BMP:   Lab Results   Component Value Date    GLU 89 07/23/2018     07/23/2018    K 3.4 (L) 07/23/2018     07/23/2018    CO2 28 07/23/2018    BUN 8 07/23/2018    CREATININE 0.7 07/23/2018    CALCIUM 9.4 07/23/2018        Assessment:       1. Epigastric abdominal pain        Plan:   Epigastric abdominal pain  -     Discontinue: ranitidine (ZANTAC) 150 MG tablet; Take 1 tablet (150 mg total) by mouth 2 (two) times daily.  Dispense: 60 tablet; Refill: 11  -     NM Gastric Emptying; Future; Expected date: 08/09/2018        Medical Decision Making/Counseling:  Patient with bloating incomplete emptying of stomach per her account worse after food intake.  Gallbladder has been removed. EGD shows mild chronic gastritis no evidence of Helicobacter pylori species.  Next workup for the patient is nuclear medicine gastric emptying study to evaluate for " gastric paresis.  If gastric paresis identified patient would be a candidate for Reglan therapy.  Follow-up 2 weeks after gastrics emptying study

## 2018-08-21 ENCOUNTER — TELEPHONE (OUTPATIENT)
Dept: SURGERY | Facility: CLINIC | Age: 63
End: 2018-08-21

## 2018-08-21 NOTE — TELEPHONE ENCOUNTER
Phoned pt.  Pt has questions regarding Gastric Emptying Study that is scheduled for tomorrow.  Instructed pt to remain NPO prior to procedure and that she will be required to eat certain things when she arrives for the test within a certain time frame.  Pt verbalizes understanding.

## 2018-08-22 ENCOUNTER — HOSPITAL ENCOUNTER (OUTPATIENT)
Dept: RADIOLOGY | Facility: HOSPITAL | Age: 63
Discharge: HOME | End: 2018-08-22
Attending: SURGERY
Payer: MEDICARE

## 2018-08-22 DIAGNOSIS — R10.13 EPIGASTRIC ABDOMINAL PAIN: ICD-10-CM

## 2018-08-22 PROCEDURE — A9541 TC99M SULFUR COLLOID: HCPCS

## 2018-08-22 PROCEDURE — 78264 GASTRIC EMPTYING IMG STUDY: CPT | Mod: 26,,, | Performed by: RADIOLOGY

## 2018-08-22 PROCEDURE — 78264 GASTRIC EMPTYING IMG STUDY: CPT | Mod: TC

## 2018-08-30 ENCOUNTER — OFFICE VISIT (OUTPATIENT)
Dept: SURGERY | Facility: CLINIC | Age: 63
End: 2018-08-30
Payer: MEDICARE

## 2018-08-30 VITALS
WEIGHT: 121 LBS | HEIGHT: 61 IN | DIASTOLIC BLOOD PRESSURE: 98 MMHG | BODY MASS INDEX: 22.84 KG/M2 | RESPIRATION RATE: 18 BRPM | OXYGEN SATURATION: 97 % | TEMPERATURE: 97 F | SYSTOLIC BLOOD PRESSURE: 137 MMHG | HEART RATE: 68 BPM

## 2018-08-30 DIAGNOSIS — K31.84 GASTROPARESIS: Primary | ICD-10-CM

## 2018-08-30 PROCEDURE — 99213 OFFICE O/P EST LOW 20 MIN: CPT | Mod: 24,S$GLB,, | Performed by: SURGERY

## 2018-08-30 PROCEDURE — 3075F SYST BP GE 130 - 139MM HG: CPT | Mod: CPTII,S$GLB,, | Performed by: SURGERY

## 2018-08-30 PROCEDURE — 3080F DIAST BP >= 90 MM HG: CPT | Mod: CPTII,S$GLB,, | Performed by: SURGERY

## 2018-08-30 PROCEDURE — 3008F BODY MASS INDEX DOCD: CPT | Mod: CPTII,S$GLB,, | Performed by: SURGERY

## 2018-08-30 RX ORDER — METOCLOPRAMIDE 5 MG/1
5 TABLET ORAL 4 TIMES DAILY
Qty: 120 TABLET | Refills: 0 | Status: SHIPPED | OUTPATIENT
Start: 2018-08-30 | End: 2018-09-19

## 2018-09-17 DIAGNOSIS — R60.9 EDEMA, UNSPECIFIED TYPE: ICD-10-CM

## 2018-09-17 RX ORDER — HYDROCHLOROTHIAZIDE 12.5 MG/1
12.5 TABLET ORAL DAILY
Qty: 30 TABLET | Refills: 2 | Status: SHIPPED | OUTPATIENT
Start: 2018-09-17 | End: 2018-11-27 | Stop reason: SDUPTHER

## 2018-09-17 NOTE — TELEPHONE ENCOUNTER
Notified pt that rx was sent to pharm      Refill request(s).  Please advise.  Thank you.       ----- Message from Destini Gunderson sent at 9/17/2018  9:06 AM CDT -----  Type:  RX Refill Request    Who Called:  Self   Refill or New Rx:  New rx   RX Name and Strength:  hydrochlorothiadide 12.5 mg   How is the patient currently taking it? (ex. 1XDay):  1 x day   Is this a 30 day or 90 day RX:  30 day supply   Preferred Pharmacy with phone number:  [email or web address]  [phone] Local or Mail Order:  Local   Ordering Provider:  Best Call Back Number:  [phone]  Additional Information:

## 2018-09-19 ENCOUNTER — OFFICE VISIT (OUTPATIENT)
Dept: SURGERY | Facility: CLINIC | Age: 63
End: 2018-09-19
Payer: MEDICARE

## 2018-09-19 VITALS
SYSTOLIC BLOOD PRESSURE: 133 MMHG | DIASTOLIC BLOOD PRESSURE: 75 MMHG | HEIGHT: 61 IN | BODY MASS INDEX: 22.84 KG/M2 | HEART RATE: 54 BPM | OXYGEN SATURATION: 96 % | WEIGHT: 121 LBS | TEMPERATURE: 97 F

## 2018-09-19 DIAGNOSIS — K31.84 GASTROPARESIS: Primary | ICD-10-CM

## 2018-09-19 PROCEDURE — 3078F DIAST BP <80 MM HG: CPT | Mod: CPTII,S$GLB,, | Performed by: SURGERY

## 2018-09-19 PROCEDURE — 3008F BODY MASS INDEX DOCD: CPT | Mod: CPTII,S$GLB,, | Performed by: SURGERY

## 2018-09-19 PROCEDURE — 99213 OFFICE O/P EST LOW 20 MIN: CPT | Mod: S$GLB,,, | Performed by: SURGERY

## 2018-09-19 PROCEDURE — 3075F SYST BP GE 130 - 139MM HG: CPT | Mod: CPTII,S$GLB,, | Performed by: SURGERY

## 2018-09-19 RX ORDER — METOCLOPRAMIDE 5 MG/1
5 TABLET ORAL 3 TIMES DAILY
Qty: 90 TABLET | Refills: 2 | Status: SHIPPED | OUTPATIENT
Start: 2018-09-19 | End: 2018-10-19

## 2018-09-19 NOTE — PROGRESS NOTES
"[facility] General Surgery  Follow-up    Subjective:       Patient ID: Claudia Suresh is a 62 y.o. female.    Chief Complaint: Follow-up (gastroparesis)      HPI:  Claudia Suresh is a 62 y.o. female presents today for follow-up examination of gastric paresis.  She in the last 3 weeks has been started on 5 mg 3 times a day Reglan therapy for her significant gastric paresis.  Symptoms have significantly improved since initiation of Reglan therapy.  Bloating has improved.  Tolerance of a diet.  Patient feels much better.  She presents today as a follow-up examination.    Review of Systems   Constitutional: Negative for activity change, appetite change, chills and fever.   HENT: Negative for congestion, dental problem and ear discharge.    Eyes: Negative for discharge and itching.   Respiratory: Negative for apnea, choking and chest tightness.    Cardiovascular: Negative for chest pain and leg swelling.   Gastrointestinal: Negative for abdominal distention, abdominal pain, anal bleeding, constipation, diarrhea and nausea.   Endocrine: Negative for cold intolerance and heat intolerance.   Genitourinary: Negative for difficulty urinating and dyspareunia.   Musculoskeletal: Negative for arthralgias and back pain.   Skin: Negative for color change and pallor.   Neurological: Negative for dizziness and facial asymmetry.   Hematological: Negative for adenopathy. Does not bruise/bleed easily.   Psychiatric/Behavioral: Negative for agitation and behavioral problems.       Objective:      Vitals:    09/19/18 0809   BP: 133/75   Pulse: (!) 54   Temp: 97 °F (36.1 °C)   TempSrc: Oral   SpO2: 96%   Weight: 54.9 kg (121 lb)   Height: 5' 1" (1.549 m)     Physical Exam   Constitutional: She is oriented to person, place, and time. She appears well-developed and well-nourished. No distress.   HENT:   Head: Normocephalic and atraumatic.   Eyes: EOM are normal. Pupils are equal, round, and reactive to light.   Neck: Normal range " of motion. Neck supple. No thyromegaly present.   Cardiovascular: Normal rate and regular rhythm.   Pulmonary/Chest: Effort normal and breath sounds normal.   Abdominal: Soft. Bowel sounds are normal. She exhibits no distension. There is no tenderness.   Musculoskeletal: Normal range of motion. She exhibits no edema or deformity.   Neurological: She is alert and oriented to person, place, and time. No cranial nerve deficit.   Skin: Skin is warm. Capillary refill takes less than 2 seconds. No erythema.   Psychiatric: She has a normal mood and affect. Her behavior is normal.     Lab Review:   CBC:   Lab Results   Component Value Date    WBC 5.61 04/27/2018    RBC 4.66 04/27/2018    HGB 14.4 04/27/2018    HCT 44.2 04/27/2018     04/27/2018     BMP:   Lab Results   Component Value Date    GLU 89 07/23/2018     07/23/2018    K 3.4 (L) 07/23/2018     07/23/2018    CO2 28 07/23/2018    BUN 8 07/23/2018    CREATININE 0.7 07/23/2018    CALCIUM 9.4 07/23/2018        Assessment:       1. Gastroparesis        Plan:   Gastroparesis  -     metoclopramide HCl (REGLAN) 5 MG tablet; Take 1 tablet (5 mg total) by mouth 3 (three) times daily.  Dispense: 90 tablet; Refill: 2        Medical Decision Making/Counseling:  Patient doing very well today.  I will prescribe the patient Reglan 3 times a day for the next 3 months and have her follow-up in 3 months to determine how her symptoms are.  It is encouraging that her symptoms have significantly improved with initiation of Reglan.  Follow-up 3 months

## 2018-10-29 ENCOUNTER — OFFICE VISIT (OUTPATIENT)
Dept: FAMILY MEDICINE | Facility: CLINIC | Age: 63
End: 2018-10-29
Payer: MEDICARE

## 2018-10-29 ENCOUNTER — TELEPHONE (OUTPATIENT)
Dept: FAMILY MEDICINE | Facility: CLINIC | Age: 63
End: 2018-10-29

## 2018-10-29 VITALS
TEMPERATURE: 98 F | HEART RATE: 79 BPM | WEIGHT: 122.19 LBS | BODY MASS INDEX: 23.07 KG/M2 | SYSTOLIC BLOOD PRESSURE: 140 MMHG | OXYGEN SATURATION: 97 % | HEIGHT: 61 IN | DIASTOLIC BLOOD PRESSURE: 89 MMHG

## 2018-10-29 DIAGNOSIS — R10.31 CHRONIC RLQ PAIN: Primary | ICD-10-CM

## 2018-10-29 DIAGNOSIS — G89.29 CHRONIC RLQ PAIN: Primary | ICD-10-CM

## 2018-10-29 DIAGNOSIS — R10.84 GENERALIZED ABDOMINAL PAIN: ICD-10-CM

## 2018-10-29 DIAGNOSIS — N39.46 MIXED STRESS AND URGE URINARY INCONTINENCE: ICD-10-CM

## 2018-10-29 PROCEDURE — 3079F DIAST BP 80-89 MM HG: CPT | Mod: CPTII,S$GLB,, | Performed by: NURSE PRACTITIONER

## 2018-10-29 PROCEDURE — 3077F SYST BP >= 140 MM HG: CPT | Mod: CPTII,S$GLB,, | Performed by: NURSE PRACTITIONER

## 2018-10-29 PROCEDURE — 99213 OFFICE O/P EST LOW 20 MIN: CPT | Mod: 25,S$GLB,, | Performed by: NURSE PRACTITIONER

## 2018-10-29 PROCEDURE — 3008F BODY MASS INDEX DOCD: CPT | Mod: CPTII,S$GLB,, | Performed by: NURSE PRACTITIONER

## 2018-10-29 PROCEDURE — 81001 URINALYSIS AUTO W/SCOPE: CPT | Mod: S$GLB,,, | Performed by: NURSE PRACTITIONER

## 2018-10-29 NOTE — PATIENT INSTRUCTIONS
Treating Incontinence in Women: Nonsurgical Methods    The best treatment for you will depend on the type of incontinence you have. Your symptoms, age, and any underlying problems that are found also affect your treatment. While some types of incontinence may eventually require surgery, nonsurgical treatments may be effective in many cases. Nonsurgical treatments include lifestyle changes, muscle-strengthening exercises, and medicines.  Nonsurgical Treatments  Treatment for stress urinary incontinence includes:  · Bladder training  · Lifestyle changes such as weight loss and increased activity if incontinence is due to being overweight  · Medicines, if bladder training has not helped  · Pelvic floor muscle exercises  Lifestyle changes  · Losing weight. Excess weight puts extra pressure on the pelvic floor muscles. Exercising and eating right can help you lose weight. This helps other treatments work better.  · Making certain diet changes. Some foods may make you need to urinate more, so it may be good to avoid them. These include caffeinated drinks and alcohol. Ask your healthcare provider whether these or other diet changes might be helpful.  · Quitting smoking. Smoking can lead to a chronic cough that strains pelvic floor muscles. Smoking may also damage the bladder and urethra.  Pelvic floor musle exercises  There are exercises you can do to help strengthen your pelvic floor muscles. The pelvic floor muscles act as a sling to help hold the bladder and urethra in place. These muscles also help keep the urethra closed. Weak pelvic floor muscles may allow urine to leak. To strengthen the pelvic floor muscles, do the exercises daily. In a few months, the muscles will be stronger and tighter. This can help prevent urine leakage.  Date Last Reviewed: 1/1/2017  © 1596-2863 [employer]. [address] [place] [address], [place] [address]. All rights reserved. This information is not intended as a substitute for  professional medical care. Always follow your healthcare professional's instructions.

## 2018-10-29 NOTE — TELEPHONE ENCOUNTER
----- Message from Rigoberto Brannon sent at 10/29/2018  9:53 AM CDT -----  Type:  Sooner Apoointment Request    Caller is requesting a sooner appointment.  Caller declined first available appointment listed below.  Caller will not accept being placed on the waitlist and is requesting a message be sent to doctor.    Name of Caller:  Patient  When is the first available appointment?  12/05  Symptoms:  Possible kidney issues  Best Call Back Number:  [phone]  Additional Information:  Please call to advise

## 2018-10-29 NOTE — PROGRESS NOTES
Subjective:       Patient ID: Claudia Suresh is a 62 y.o. female.    Chief Complaint: Urinary Tract Infection and Pain (pain on right side of abdomen x few months )    61 y/o presents with c/o urgency and frequency. UA resulted negative except 3+ blood noted that pt reports h/o. She reports having to wear incontinent pads daily.  She c/o RLQ pain as noted below. ABD US and heptobilliary exams were negative. She denies N/V, fever, chills or diarrhea.       Abdominal Pain   This is a chronic problem. The current episode started more than 1 year ago. The problem occurs constantly. The problem has been unchanged. The pain is located in the RLQ. The pain is at a severity of 5/10. The pain is moderate. The quality of the pain is aching. The abdominal pain does not radiate. Associated symptoms include constipation and frequency. Pertinent negatives include no diarrhea, dysuria, fever, headaches, nausea or vomiting. Nothing aggravates the pain. The pain is relieved by nothing. She has tried acetaminophen, antacids, H2 blockers and proton pump inhibitors for the symptoms. The treatment provided no relief. Prior diagnostic workup includes CT scan. Her past medical history is significant for abdominal surgery.     Review of Systems   Constitutional: Negative for chills, diaphoresis, fever and unexpected weight change.   HENT: Negative for dental problem and trouble swallowing.    Eyes: Negative for visual disturbance.   Respiratory: Negative for shortness of breath and wheezing.    Cardiovascular: Negative for chest pain and palpitations.   Gastrointestinal: Positive for abdominal pain and constipation. Negative for diarrhea, nausea and vomiting.   Endocrine: Negative for polydipsia and polyuria.   Genitourinary: Positive for frequency and urgency. Negative for dysuria.   Musculoskeletal: Negative for joint swelling.   Skin: Negative for rash.   Neurological: Negative for syncope and headaches.   Psychiatric/Behavioral:  Negative for agitation and confusion.       Objective:      Physical Exam   Constitutional: She is oriented to person, place, and time. She appears well-developed and well-nourished.   HENT:   Head: Normocephalic.   Eyes: Pupils are equal, round, and reactive to light.   Neck: Normal range of motion. Neck supple.   Cardiovascular: Normal rate, regular rhythm and normal heart sounds.   Pulmonary/Chest: Effort normal and breath sounds normal.   Abdominal: Soft. Bowel sounds are normal.   Musculoskeletal: Normal range of motion.   Neurological: She is alert and oriented to person, place, and time.   Skin: Skin is warm and dry. Capillary refill takes less than 2 seconds.   Psychiatric: She has a normal mood and affect. Judgment and thought content normal.   Vitals reviewed.      Assessment:       1. Chronic RLQ pain    2. Generalized abdominal pain    3. Mixed stress and urge urinary incontinence        Plan:       1- start myrbetriq for urgency and frequency  2- monitor abdominal pain     3- CT Abd for non-specific abdominal pain

## 2018-10-30 ENCOUNTER — TELEPHONE (OUTPATIENT)
Dept: FAMILY MEDICINE | Facility: CLINIC | Age: 63
End: 2018-10-30

## 2018-10-30 LAB
BILIRUB SERPL-MCNC: ABNORMAL MG/DL
BLOOD URINE, POC: ABNORMAL
COLOR, POC UA: ABNORMAL
GLUCOSE UR QL STRIP: ABNORMAL
KETONES UR QL STRIP: ABNORMAL
LEUKOCYTE ESTERASE URINE, POC: ABNORMAL
NITRITE, POC UA: ABNORMAL
PH, POC UA: 6
PROTEIN, POC: ABNORMAL
SPECIFIC GRAVITY, POC UA: 1.01
UROBILINOGEN, POC UA: 0.2

## 2018-10-30 NOTE — TELEPHONE ENCOUNTER
+Pharm requesting alternative medication r/t cost to pt.  Please advise, thank you.      ----- Message from Tammy Aguiar sent at 10/30/2018 10:19 AM CDT -----  Type:  Pharmacy Calling to Clarify an RX    Name of Caller:  [facility]  Pharmacy Name:    [facility] - [place] - [address]  [address] [address]  Phone: [phone] Fax: [phone]  Prescription Name:  mirabegron (MYRBETRIQ) 25 mg Tb24 ER tablet  What do they need to clarify?:  Requesting alternative, due to cost to patient   Best Call Back Number:  [phone]  Additional Information:

## 2018-11-13 ENCOUNTER — TELEPHONE (OUTPATIENT)
Dept: FAMILY MEDICINE | Facility: CLINIC | Age: 63
End: 2018-11-13

## 2018-11-13 ENCOUNTER — OFFICE VISIT (OUTPATIENT)
Dept: FAMILY MEDICINE | Facility: CLINIC | Age: 63
End: 2018-11-13
Payer: MEDICARE

## 2018-11-13 VITALS
SYSTOLIC BLOOD PRESSURE: 140 MMHG | HEART RATE: 67 BPM | OXYGEN SATURATION: 99 % | HEIGHT: 61 IN | TEMPERATURE: 97 F | WEIGHT: 122 LBS | BODY MASS INDEX: 23.03 KG/M2 | DIASTOLIC BLOOD PRESSURE: 92 MMHG

## 2018-11-13 DIAGNOSIS — Z79.899 HIGH RISK MEDICATION USE: ICD-10-CM

## 2018-11-13 DIAGNOSIS — K21.9 GASTROESOPHAGEAL REFLUX DISEASE WITHOUT ESOPHAGITIS: ICD-10-CM

## 2018-11-13 DIAGNOSIS — I10 HYPERTENSION, UNSPECIFIED TYPE: ICD-10-CM

## 2018-11-13 DIAGNOSIS — R10.9 ABDOMINAL PAIN, UNSPECIFIED ABDOMINAL LOCATION: ICD-10-CM

## 2018-11-13 DIAGNOSIS — Z13.220 LIPID SCREENING: ICD-10-CM

## 2018-11-13 DIAGNOSIS — R32 INCONTINENCE IN FEMALE: Primary | ICD-10-CM

## 2018-11-13 DIAGNOSIS — F17.200 SMOKER: ICD-10-CM

## 2018-11-13 PROBLEM — K80.20 SYMPTOMATIC CHOLELITHIASIS: Status: RESOLVED | Noted: 2018-05-30 | Resolved: 2018-11-13

## 2018-11-13 PROCEDURE — 99214 OFFICE O/P EST MOD 30 MIN: CPT | Mod: S$GLB,,, | Performed by: NURSE PRACTITIONER

## 2018-11-13 PROCEDURE — 3008F BODY MASS INDEX DOCD: CPT | Mod: CPTII,S$GLB,, | Performed by: NURSE PRACTITIONER

## 2018-11-13 PROCEDURE — 3077F SYST BP >= 140 MM HG: CPT | Mod: CPTII,S$GLB,, | Performed by: NURSE PRACTITIONER

## 2018-11-13 PROCEDURE — 3080F DIAST BP >= 90 MM HG: CPT | Mod: CPTII,S$GLB,, | Performed by: NURSE PRACTITIONER

## 2018-11-13 RX ORDER — ONDANSETRON HYDROCHLORIDE 8 MG/1
8 TABLET, FILM COATED ORAL EVERY 8 HOURS PRN
Qty: 30 TABLET | Refills: 1 | Status: SHIPPED | OUTPATIENT
Start: 2018-11-13 | End: 2019-05-21

## 2018-11-13 RX ORDER — SUCRALFATE 1 G/1
1 TABLET ORAL
Qty: 160 TABLET | Refills: 0 | Status: SHIPPED | OUTPATIENT
Start: 2018-11-13 | End: 2019-05-21

## 2018-11-13 RX ORDER — PANTOPRAZOLE SODIUM 40 MG/1
40 TABLET, DELAYED RELEASE ORAL DAILY
Qty: 30 TABLET | Refills: 1 | Status: SHIPPED | OUTPATIENT
Start: 2018-11-13 | End: 2019-05-21

## 2018-11-13 NOTE — PROGRESS NOTES
Subjective:       Patient ID: Claudia Suresh is a 63 y.o. female.    Chief Complaint: Follow-up (abdominal pain, pt states symptoms are worsening and the pain has moved from the lower abdomen to the sternum)    62 y/o female presents for 2 week follow up for RLQ pain and urinary urgency with incontinence, started on Myrbetriq. She reports not being able to take the Mybertiq as it made her too lethargic. We discussed doing a urine culture since her last UA was negative for leukocytes but positive for blood which she states she has a h/o. At last visit her pain was RLQ today she reports epigastric. She describes the epigastric pain as a ball with constant pressure and belching. She was not able to do the CT of Abd but says she can do it this week. Her spouse is ill and she needed someone to sit with him.       Abdominal Pain   This is a chronic problem. The current episode started more than 1 month ago. The onset quality is gradual. The problem occurs constantly. The problem has been gradually worsening. The pain is located in the epigastric region and RLQ. The pain is at a severity of 4/10. The pain is mild. The quality of the pain is aching. The abdominal pain does not radiate. Associated symptoms include anorexia, belching, frequency, hematuria and nausea. Pertinent negatives include no constipation, diarrhea, dysuria, fever, headaches or vomiting. Nothing aggravates the pain. The pain is relieved by nothing. She has tried nothing for the symptoms. The treatment provided no relief. Prior diagnostic workup includes CT scan. Her past medical history is significant for abdominal surgery.     Review of Systems   Constitutional: Negative for chills, diaphoresis, fever and unexpected weight change.   HENT: Negative for dental problem and trouble swallowing.    Eyes: Negative for visual disturbance.   Respiratory: Negative for shortness of breath and wheezing.    Cardiovascular: Negative for chest pain and  palpitations.   Gastrointestinal: Positive for abdominal pain, anorexia and nausea. Negative for constipation, diarrhea and vomiting.   Endocrine: Negative for polydipsia and polyuria.   Genitourinary: Positive for frequency and hematuria. Negative for dysuria.   Musculoskeletal: Negative for joint swelling.   Skin: Negative for rash.   Neurological: Negative for syncope and headaches.   Psychiatric/Behavioral: Negative for agitation and confusion.       Objective:      Physical Exam   Constitutional: She is oriented to person, place, and time. She appears well-developed and well-nourished.   HENT:   Head: Normocephalic.   Eyes: Pupils are equal, round, and reactive to light.   Neck: Normal range of motion. Neck supple.   Cardiovascular: Normal rate, regular rhythm and normal heart sounds.   Pulmonary/Chest: Effort normal and breath sounds normal.   Abdominal: Soft. Bowel sounds are normal.   Musculoskeletal: Normal range of motion.   Neurological: She is alert and oriented to person, place, and time.   Skin: Skin is warm and dry. Capillary refill takes less than 2 seconds.   Psychiatric: She has a normal mood and affect. Judgment and thought content normal.   Vitals reviewed.      Assessment:       1. Incontinence in female    2. Abdominal pain, unspecified abdominal location    3. Gastroesophageal reflux disease without esophagitis        Plan:       1- start protonix  2- start carafate  3-zofran for nausea  4- CT ABD this week

## 2018-11-13 NOTE — PATIENT INSTRUCTIONS
Sucralfate oral suspension  What is this medicine?  SUCRALFATE (REBECCA jorge fate) helps to treat ulcers of the intestine.  How should I use this medicine?  Take this medicine by mouth with a glass of water. Follow the directions on the prescription label. Shake well before using. Use a specially marked spoon or container to measure your medicine. Ask your pharmacist if you do not have one. Household spoons are not accurate. This medicine works best if you take it on an empty stomach, 1 hour before meals. Take your doses at regular intervals. Do not take your medicine more often than directed. Do not stop taking except on your doctor's advice.  Talk to your pediatrician regarding the use of this medicine in children. Special care may be needed.  What side effects may I notice from receiving this medicine?  Side effects that you should report to your doctor or health care professional as soon as possible:  · allergic reactions like skin rash, itching or hives, swelling of the face, lips, or tongue  · difficulty breathing  Side effects that usually do not require medical attention (report to your doctor or health care professional if they continue or are bothersome):  · back pain  · constipation  · drowsy, dizzy  · dry mouth  · headache  · stomach upset, gas  · trouble sleeping  What may interact with this medicine?  · antacid  · cimetidine  · digoxin  · ketoconazole  · phenytoin  · quinidine  · ranitidine  · some antibiotics like ciprofloxacin, norfloxacin, and ofloxacin  · theophylline  · thyroid hormones  · warfarin  What if I miss a dose?  If you miss a dose, take it as soon as you can. If it is almost time for your next dose, take only that dose. Do not take double or extra doses.  Where should I keep my medicine?  Keep out of the reach of children.  Store at room temperature between 20 and 25 degrees C (68 and 77 degrees F). Keep container tightly closed. Throw away any unused medicine after the expiration  date.  What should I tell my health care provider before I take this medicine?  They need to know if you have any of these conditions:  · kidney disease  · an unusual or allergic reaction to sucralfate, other medicines, foods, dyes, or preservatives  · pregnant or trying to get pregnant  · breast-feeding  What should I watch for while using this medicine?  Visit your doctor or health care professional for regular check ups. Let your doctor know if your symptoms do not improve or if you feel worse.  Antacids should not be taken within one half hour before or after this medicine.  NOTE:This sheet is a summary. It may not cover all possible information. If you have questions about this medicine, talk to your doctor, pharmacist, or health care provider. Copyright© 2017 [employer]    --------------------------------------------------------------------------------------------------------------    GERD (Adult)    The esophagus is a tube that carries food from the mouth to the stomach. A valve at the lower end of the esophagus prevents stomach acid from flowing upward. When this valve doesn't work properly, stomach contents may repeatedly flow back up (reflux) into the esophagus. This is called gastroesophageal reflux disease (GERD). GERD can irritate the esophagus. It can cause problems with swallowing or breathing. In severe cases, GERD can cause recurrent pneumonia or other serious problems.  Symptoms of reflux include burning, pressure or sharp pain in the upper abdomen or mid to lower chest. The pain can spread to the neck, back, or shoulder. There may be belching, an acid taste in the back of the throat, chronic cough, or sore throat or hoarseness. GERD symptoms often occur during the day after a big meal. They can also occur at night when lying down.   Home care  Lifestyle changes can help reduce symptoms. If needed, medicines may be prescribed. Symptoms often improve with treatment, but if treatment is stopped,  "the symptoms often return after a few months. So most persons with GERD will need to continue treatment.  Lifestyle changes  · Limit or avoid fatty, fried, and spicy foods, as well as coffee, chocolate, mint, and foods with high acid content such as tomatoes and citrus fruit and juices (orange, grapefruit, lemon).  · Dont eat large meals, especially at night. Frequent, smaller meals are best. Do not lie down right after eating. And dont eat anything 3 hours before going to bed.  · Avoid drinking alcohol and smoking. As much as possible, stay away from second hand smoke.  · If you are overweight, losing weight will reduce symptoms.   · Avoid wearing tight clothing around your stomach area.  · If your symptoms occur during sleep, use a foam wedge to elevate your upper body (not just your head.) Or, place 4" blocks under the head of your bed.  Medicines  If needed, medicines can help relieve the symptoms of GERD and prevent damage to the esophagus. Discuss a medicine plan with your healthcare provider. This may include one or more of the following medicines:  · Antacids to help neutralize the normal acids in your stomach.  · Acid blockers (H2 blockers) to decrease acid production.  · Acid inhibitors (PPIs) to decrease acid production in a different way than the blockers. They may work better, but can take a little longer to take effect.  Take an antacid 30-60 minutes after eating and at bedtime, but not at the same time as an acid blocker.  Try not to take medicines such as ibuprofen and aspirin. If you are taking aspirin for your heart or other medical reasons, talk to your healthcare provider about stopping it.  Follow-up care  Follow up with your healthcare provider or as advised by our staff.  When to seek medical advice  Call your healthcare provider if any of the following occur:  · Stomach pain gets worse or moves to the lower right abdomen (appendix area)  · Chest pain appears or gets worse, or spreads to the " back, neck, shoulder, or arm  · Frequent vomiting (cant keep down liquids)  · Blood in the stool or vomit (red or black in color)  · Feeling weak or dizzy  · Fever of 100.4ºF (38ºC) or higher, or as directed by your healthcare provider  Date Last Reviewed: 6/23/2015  © 0770-4842 [employer]. [address] [place] [address]. All rights reserved. This information is not intended as a substitute for professional medical care. Always follow your healthcare professional's instructions.

## 2018-11-14 ENCOUNTER — HOSPITAL ENCOUNTER (OUTPATIENT)
Dept: RADIOLOGY | Facility: HOSPITAL | Age: 63
Discharge: HOME | End: 2018-11-14
Attending: NURSE PRACTITIONER
Payer: MEDICARE

## 2018-11-14 DIAGNOSIS — R10.84 GENERALIZED ABDOMINAL PAIN: ICD-10-CM

## 2018-11-14 DIAGNOSIS — N39.0 URINARY TRACT INFECTION WITHOUT HEMATURIA, SITE UNSPECIFIED: Primary | ICD-10-CM

## 2018-11-14 PROCEDURE — 25500020 PHARM REV CODE 255: Performed by: NURSE PRACTITIONER

## 2018-11-14 PROCEDURE — 87086 URINE CULTURE/COLONY COUNT: CPT

## 2018-11-14 PROCEDURE — 74177 CT ABD & PELVIS W/CONTRAST: CPT | Mod: 26,,, | Performed by: RADIOLOGY

## 2018-11-14 PROCEDURE — 74177 CT ABD & PELVIS W/CONTRAST: CPT | Mod: TC

## 2018-11-14 RX ADMIN — IOHEXOL 75 ML: 350 INJECTION, SOLUTION INTRAVENOUS at 09:11

## 2018-11-14 RX ADMIN — IOHEXOL 30 ML: 300 INJECTION, SOLUTION INTRAVENOUS at 07:11

## 2018-11-15 LAB — BACTERIA UR CULT: NORMAL

## 2018-11-27 ENCOUNTER — OFFICE VISIT (OUTPATIENT)
Dept: FAMILY MEDICINE | Facility: CLINIC | Age: 63
End: 2018-11-27
Payer: MEDICARE

## 2018-11-27 VITALS
HEIGHT: 61 IN | TEMPERATURE: 98 F | OXYGEN SATURATION: 99 % | WEIGHT: 122 LBS | HEART RATE: 81 BPM | SYSTOLIC BLOOD PRESSURE: 124 MMHG | DIASTOLIC BLOOD PRESSURE: 74 MMHG | BODY MASS INDEX: 23.03 KG/M2

## 2018-11-27 DIAGNOSIS — M81.0 POST-MENOPAUSAL OSTEOPOROSIS: ICD-10-CM

## 2018-11-27 DIAGNOSIS — R60.9 EDEMA, UNSPECIFIED TYPE: ICD-10-CM

## 2018-11-27 DIAGNOSIS — M81.0 OSTEOPOROSIS, UNSPECIFIED OSTEOPOROSIS TYPE, UNSPECIFIED PATHOLOGICAL FRACTURE PRESENCE: Primary | ICD-10-CM

## 2018-11-27 DIAGNOSIS — R10.9 ABDOMINAL PAIN, UNSPECIFIED ABDOMINAL LOCATION: ICD-10-CM

## 2018-11-27 DIAGNOSIS — R32 URINARY INCONTINENCE, UNSPECIFIED TYPE: ICD-10-CM

## 2018-11-27 PROBLEM — K75.81 NASH (NONALCOHOLIC STEATOHEPATITIS): Status: RESOLVED | Noted: 2018-06-18 | Resolved: 2018-11-27

## 2018-11-27 LAB
BILIRUB SERPL-MCNC: ABNORMAL MG/DL
BLOOD URINE, POC: ABNORMAL
COLOR, POC UA: ABNORMAL
GLUCOSE UR QL STRIP: ABNORMAL
KETONES UR QL STRIP: ABNORMAL
LEUKOCYTE ESTERASE URINE, POC: ABNORMAL
NITRITE, POC UA: ABNORMAL
PH, POC UA: 6.5
PROTEIN, POC: ABNORMAL
SPECIFIC GRAVITY, POC UA: 1.01
UROBILINOGEN, POC UA: 0.2

## 2018-11-27 PROCEDURE — 87086 URINE CULTURE/COLONY COUNT: CPT

## 2018-11-27 PROCEDURE — 99213 OFFICE O/P EST LOW 20 MIN: CPT | Mod: 25,S$GLB,, | Performed by: NURSE PRACTITIONER

## 2018-11-27 PROCEDURE — 3008F BODY MASS INDEX DOCD: CPT | Mod: CPTII,S$GLB,, | Performed by: NURSE PRACTITIONER

## 2018-11-27 PROCEDURE — 81001 URINALYSIS AUTO W/SCOPE: CPT | Mod: S$GLB,,, | Performed by: NURSE PRACTITIONER

## 2018-11-27 PROCEDURE — 3078F DIAST BP <80 MM HG: CPT | Mod: CPTII,S$GLB,, | Performed by: NURSE PRACTITIONER

## 2018-11-27 PROCEDURE — 3074F SYST BP LT 130 MM HG: CPT | Mod: CPTII,S$GLB,, | Performed by: NURSE PRACTITIONER

## 2018-11-27 RX ORDER — HYDROCHLOROTHIAZIDE 12.5 MG/1
12.5 TABLET ORAL DAILY
Qty: 90 TABLET | Refills: 1 | Status: SHIPPED | OUTPATIENT
Start: 2018-11-27 | End: 2018-11-29 | Stop reason: SDUPTHER

## 2018-11-27 NOTE — PROGRESS NOTES
Subjective:       Patient ID: Claudia Suresh is a 63 y.o. female.    Chief Complaint: Follow-up (lab review )    64 y/o female presents for follow up to discuss Abd CT and labs thus all normal except some bone minelalization noted thus will order dexa scan as she has never had one. She reports abd pain improved since taking carafate and protonix. UA done today as a UTI follow up with WBC and RBC noted thus will send for culture. She reports post CT drinking a bottle of magnesium citrate to do a bowel clense and she feels a lot better.  She denies dysuria, SOB, CP, fever or chills.      Review of Systems   Constitutional: Negative for chills, diaphoresis, fever and unexpected weight change.   HENT: Negative for dental problem and trouble swallowing.    Eyes: Negative for visual disturbance.   Respiratory: Negative for shortness of breath and wheezing.    Cardiovascular: Negative for chest pain and palpitations.   Gastrointestinal: Negative for abdominal pain, constipation, diarrhea, nausea and vomiting.   Endocrine: Negative for polydipsia and polyuria.   Genitourinary: Negative for dysuria.   Musculoskeletal: Negative for joint swelling.   Skin: Negative for rash.   Neurological: Negative for syncope and headaches.   Psychiatric/Behavioral: Negative for agitation and confusion.       Objective:      Physical Exam   Constitutional: She is oriented to person, place, and time. She appears well-developed and well-nourished.   HENT:   Head: Normocephalic.   Eyes: Pupils are equal, round, and reactive to light.   Neck: Normal range of motion. Neck supple.   Cardiovascular: Normal rate, regular rhythm and normal heart sounds.   Pulmonary/Chest: Effort normal and breath sounds normal.   Abdominal: Soft. Bowel sounds are normal.   Musculoskeletal: Normal range of motion.   Neurological: She is alert and oriented to person, place, and time.   Skin: Skin is warm and dry. Capillary refill takes less than 2 seconds.    Psychiatric: She has a normal mood and affect. Judgment and thought content normal.   Vitals reviewed.      Assessment:       1. Osteoporosis, unspecified osteoporosis type, unspecified pathological fracture presence    2. Edema, unspecified type    3. Post-menopausal osteoporosis    4. Abdominal pain, unspecified abdominal location    5. Urinary incontinence, unspecified type        Plan:       1- refill on diuretic  2- bone density scan

## 2018-11-28 ENCOUNTER — TELEPHONE (OUTPATIENT)
Dept: FAMILY MEDICINE | Facility: CLINIC | Age: 63
End: 2018-11-28

## 2018-11-28 NOTE — TELEPHONE ENCOUNTER
Spoke with Luanne @ pharmacy, rx not received.  Please advise.      ----- Message from Tiara Rodriguez sent at 11/28/2018  4:02 PM CST -----  Contact: self   Patient need to speak with a nurse, she has not received her rx for fluid pills. Please call back at [phone] (home)            [facility] - [place] - [address]  [address] [place] [address]  Phone: [phone] Fax: [phone]

## 2018-11-29 DIAGNOSIS — R60.9 EDEMA, UNSPECIFIED TYPE: ICD-10-CM

## 2018-11-29 LAB — BACTERIA UR CULT: NO GROWTH

## 2018-11-29 RX ORDER — HYDROCHLOROTHIAZIDE 12.5 MG/1
12.5 TABLET ORAL DAILY
Qty: 90 TABLET | Refills: 1 | Status: SHIPPED | OUTPATIENT
Start: 2018-11-29 | End: 2020-02-18 | Stop reason: SDUPTHER

## 2019-05-20 ENCOUNTER — TELEPHONE (OUTPATIENT)
Dept: FAMILY MEDICINE | Facility: CLINIC | Age: 64
End: 2019-05-20

## 2019-05-20 NOTE — TELEPHONE ENCOUNTER
----- Message from Vijaya Jolly sent at 5/20/2019  9:04 AM CDT -----  Contact: Patient  Type:  Same Day Appointment Request    Caller is requesting a same day appointment.  Caller declined first available appointment listed below.      Name of Caller:  Patient  When is the first available appointment?  6/5/19  Symptoms:  stomach pain  Best Call Back Number:    Additional Information:   Calling to schedule a same day appt today, if possible.

## 2019-05-21 ENCOUNTER — OFFICE VISIT (OUTPATIENT)
Dept: FAMILY MEDICINE | Facility: CLINIC | Age: 64
End: 2019-05-21
Payer: MEDICARE

## 2019-05-21 VITALS
DIASTOLIC BLOOD PRESSURE: 83 MMHG | BODY MASS INDEX: 22.07 KG/M2 | OXYGEN SATURATION: 97 % | TEMPERATURE: 99 F | WEIGHT: 116.81 LBS | SYSTOLIC BLOOD PRESSURE: 125 MMHG | HEART RATE: 72 BPM

## 2019-05-21 DIAGNOSIS — K29.90 GASTRITIS AND DUODENITIS: Primary | ICD-10-CM

## 2019-05-21 PROCEDURE — 3074F SYST BP LT 130 MM HG: CPT | Mod: CPTII,S$GLB,, | Performed by: FAMILY MEDICINE

## 2019-05-21 PROCEDURE — 3074F PR MOST RECENT SYSTOLIC BLOOD PRESSURE < 130 MM HG: ICD-10-PCS | Mod: CPTII,S$GLB,, | Performed by: FAMILY MEDICINE

## 2019-05-21 PROCEDURE — 3008F BODY MASS INDEX DOCD: CPT | Mod: CPTII,S$GLB,, | Performed by: FAMILY MEDICINE

## 2019-05-21 PROCEDURE — 3008F PR BODY MASS INDEX (BMI) DOCUMENTED: ICD-10-PCS | Mod: CPTII,S$GLB,, | Performed by: FAMILY MEDICINE

## 2019-05-21 PROCEDURE — 99214 PR OFFICE/OUTPT VISIT, EST, LEVL IV, 30-39 MIN: ICD-10-PCS | Mod: S$GLB,,, | Performed by: FAMILY MEDICINE

## 2019-05-21 PROCEDURE — 3079F PR MOST RECENT DIASTOLIC BLOOD PRESSURE 80-89 MM HG: ICD-10-PCS | Mod: CPTII,S$GLB,, | Performed by: FAMILY MEDICINE

## 2019-05-21 PROCEDURE — 3079F DIAST BP 80-89 MM HG: CPT | Mod: CPTII,S$GLB,, | Performed by: FAMILY MEDICINE

## 2019-05-21 PROCEDURE — 99214 OFFICE O/P EST MOD 30 MIN: CPT | Mod: S$GLB,,, | Performed by: FAMILY MEDICINE

## 2019-05-21 RX ORDER — PANTOPRAZOLE SODIUM 40 MG/1
40 TABLET, DELAYED RELEASE ORAL DAILY
Qty: 30 TABLET | Refills: 11 | Status: SHIPPED | OUTPATIENT
Start: 2019-05-21 | End: 2020-02-18

## 2019-05-21 RX ORDER — GABAPENTIN 300 MG/1
CAPSULE ORAL
Refills: 0 | COMMUNITY
Start: 2019-03-14 | End: 2020-12-14 | Stop reason: DRUGHIGH

## 2019-05-21 NOTE — PROGRESS NOTES
Subjective:       Patient ID: Claudia Suresh is a 63 y.o. female.    Chief Complaint: Abdominal Pain and Nausea    New to me patient here for UC visit.  Chart reviewed - Gastro-Duodentitis - per EGD 5/2018; also had Delayed Gastric Emptying.    Abdominal Pain   This is a recurrent problem. The current episode started more than 1 month ago. The onset quality is gradual. The problem occurs daily. The problem has been waxing and waning. The pain is located in the epigastric region. The pain is moderate. The quality of the pain is aching and a sensation of fullness. Associated symptoms include anorexia, constipation (managed with water and Miralax; occ MOM prn) and nausea. Pertinent negatives include no diarrhea, fever, hematochezia, melena or vomiting. Treatments tried: off PPI's for some months.   Nausea   Associated symptoms include abdominal pain, anorexia and nausea. Pertinent negatives include no chest pain, fever, rash or vomiting.     Review of Systems   Constitutional: Negative for fever.   Respiratory: Negative for shortness of breath.    Cardiovascular: Negative for chest pain.   Gastrointestinal: Positive for abdominal pain, anorexia, constipation (managed with water and Miralax; occ MOM prn) and nausea. Negative for diarrhea, hematochezia, melena and vomiting.   Skin: Negative for rash.   All other systems reviewed and are negative.      Objective:      Physical Exam   Constitutional: She appears well-developed and well-nourished.   Eyes: Pupils are equal, round, and reactive to light. No scleral icterus.   Neck: Neck supple.   Cardiovascular: Normal rate and regular rhythm.   No murmur heard.  Pulmonary/Chest: Effort normal and breath sounds normal.   Abdominal: Soft. Bowel sounds are normal. There is tenderness in the epigastric area. There is no rebound and no guarding.   Musculoskeletal: She exhibits no edema or tenderness.   Lymphadenopathy:     She has no cervical adenopathy.   Skin: Skin is warm  and dry.       Assessment:       1. Gastritis and duodenitis        Plan:       Claudia was seen today for abdominal pain and nausea.    Diagnoses and all orders for this visit:    Gastritis and duodenitis    Other orders  -     pantoprazole (PROTONIX) 40 MG tablet; Take 1 tablet (40 mg total) by mouth once daily.      Rec Rx for 6 weeks then use meds prn; RTC INI with above.

## 2019-08-14 ENCOUNTER — HOSPITAL ENCOUNTER (EMERGENCY)
Facility: HOSPITAL | Age: 64
Discharge: HOME | End: 2019-08-14
Attending: FAMILY MEDICINE
Payer: MEDICARE

## 2019-08-14 VITALS
BODY MASS INDEX: 23.95 KG/M2 | SYSTOLIC BLOOD PRESSURE: 139 MMHG | DIASTOLIC BLOOD PRESSURE: 90 MMHG | HEIGHT: 60 IN | HEART RATE: 70 BPM | WEIGHT: 122 LBS | RESPIRATION RATE: 18 BRPM | OXYGEN SATURATION: 99 % | TEMPERATURE: 98 F

## 2019-08-14 DIAGNOSIS — R52 PAIN: ICD-10-CM

## 2019-08-14 DIAGNOSIS — M54.2 CERVICAL PAIN: Primary | ICD-10-CM

## 2019-08-14 PROCEDURE — 72040 X-RAY EXAM NECK SPINE 2-3 VW: CPT | Mod: 26,,, | Performed by: RADIOLOGY

## 2019-08-14 PROCEDURE — 99283 EMERGENCY DEPT VISIT LOW MDM: CPT

## 2019-08-14 PROCEDURE — 72040 XR CERVICAL SPINE AP LATERAL: ICD-10-PCS | Mod: 26,,, | Performed by: RADIOLOGY

## 2019-08-14 PROCEDURE — 72040 X-RAY EXAM NECK SPINE 2-3 VW: CPT | Mod: TC,FY

## 2019-08-14 NOTE — ED PROVIDER NOTES
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    Essential hypertension     Tobacco abuse 1/7/2016     Past Surgical History:   Procedure Laterality Date    BIOPSY, LIVER, LAPAROSCOPIC N/A 6/18/2018    Per[facility]    CHOLECYSTECTOMY  06/18/2018    CHOLECYSTECTOMY-LAPAROSCOPIC Bilateral 6/18/2018    Performed by [facility] at [facility]    COLONOSCOPY  12/06/2013    COLONOSCOPY N/A 5/1/2018    Performed by [facility]en Boyd [facility] at [facility]O    DISKECTOMY AND FUSION-ANTERIOR CERVICAL (ACDF) C4-5, 5-6 N/A 1/11/2016    [facility] at [facility] 2ND F[facility]    ESOPHAGOGASTRODUODENOSCOPY  12/06/2013    ESOPHAGOGASTRODUODENOSCOPY (EGD) N/A 5/1/2018    Performed by [facility]en [facility] at [facility]    NECK SURGERY      C4-5, C5-6 fusion; discectomy     none       Family History   Problem Relation Age of Onset    Stroke Mother     Ovarian cancer Mother     Cancer Mother     Cancer Father      Social History     Tobacco Use    Smoking status: Former Smoker     Packs/day: 1.00     Years: 40.00     Pack years: 40.00     Types: Cigarettes     Last attempt to quit: 2016     Years since quitting: 3.6    Smokeless tobacco: Never Used   Substance Use Topics    Alcohol use: Yes     Comment: beer occasional    Drug use: No     Review of Systems   Constitutional: Negative for fever.   HENT: Negative for sore throat.    Respiratory: Negative  for shortness of breath.    Cardiovascular: Negative for chest pain.   Gastrointestinal: Negative for nausea.   Genitourinary: Negative for dysuria.   Musculoskeletal: Negative for back pain.   Skin: Negative for rash.   Neurological: Negative for weakness.   Hematological: Does not bruise/bleed easily.       Physical Exam     Initial Vitals [08/14/19 0935]   BP Pulse Resp Temp SpO2   (!) 139/90 70 18 98.2 °F (36.8 °C) 99 %      MAP       --         Physical Exam    Nursing note and vitals reviewed.  Constitutional: She appears well-developed and well-nourished. She is not diaphoretic. No distress.   HENT:   Head: Normocephalic and atraumatic.   Right Ear: External ear normal.   Left Ear: External ear normal.   Eyes: Pupils are equal, round, and reactive to light. Right eye exhibits no discharge. Left eye exhibits no discharge.   Neck: No tracheal deviation present. No JVD present.   Cardiovascular: Exam reveals no friction rub.    No murmur heard.  Pulmonary/Chest: No stridor. No respiratory distress. She has no wheezes. She has no rales.   Abdominal: Bowel sounds are normal. She exhibits no distension.   Musculoskeletal: Normal range of motion. She exhibits tenderness.   Positive minimal tenderness to the right paraspinous musculature at C6-7 level   Neurological: She is alert.   Skin: Skin is warm.   Psychiatric: She has a normal mood and affect.         ED Course   Procedures  Labs Reviewed - No data to display       Imaging Results          X-Ray Cervical Spine AP And Lateral (Final result)  Result time 08/14/19 10:48:12    Final result by Dk Hudson MD (08/14/19 10:48:12)                 Impression:      1. Prior ACDF from C4-C6 in alignment.  2. Mild to moderate multilevel degenerative disc disease most pronounced at C6-C7.  3. No significant prevertebral soft tissue swelling.      Electronically signed by: Dk Hudson  Date:    08/14/2019  Time:    10:48             Narrative:    EXAMINATION:  XR  CERVICAL SPINE AP LATERAL    CLINICAL HISTORY:  Pain, unspecified    TECHNIQUE:  AP, lateral and open mouth views of the cervical spine were performed.    COMPARISON:  02/23/2016.    FINDINGS:  There is straightening of the normal cervical lordosis.  Prior ACDF from C4-C6 with interbody spacers in place.  Normal alignment.  No plain film evidence to suggest hardware failure.    Remaining cervical vertebral bodies are normal in height and alignment.  No acute fracture.  No significant prevertebral soft tissue swelling.    There is mild to moderate multilevel degenerative disc disease most pronounced at C6-C7.                                                      Clinical Impression:       ICD-10-CM ICD-9-CM   1. Cervical pain M54.2 723.1   2. Pain R52 780.96                                Bg Saldaña MD  08/14/19 1759       Bg Saldaña MD  09/01/19 0932

## 2019-08-14 NOTE — ED TRIAGE NOTES
Patient reports rt sided neck pain radianing down right arm. 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 Onset of pain x 4 days ago.

## 2020-02-18 ENCOUNTER — OFFICE VISIT (OUTPATIENT)
Dept: FAMILY MEDICINE | Facility: CLINIC | Age: 65
End: 2020-02-18
Payer: MEDICARE

## 2020-02-18 VITALS
BODY MASS INDEX: 22.81 KG/M2 | HEIGHT: 60 IN | OXYGEN SATURATION: 97 % | HEART RATE: 59 BPM | TEMPERATURE: 98 F | SYSTOLIC BLOOD PRESSURE: 156 MMHG | DIASTOLIC BLOOD PRESSURE: 88 MMHG | RESPIRATION RATE: 14 BRPM | WEIGHT: 116.19 LBS

## 2020-02-18 DIAGNOSIS — R10.9 ABDOMINAL PAIN, UNSPECIFIED ABDOMINAL LOCATION: ICD-10-CM

## 2020-02-18 DIAGNOSIS — R00.1 BRADYCARDIA: ICD-10-CM

## 2020-02-18 DIAGNOSIS — R60.9 EDEMA, UNSPECIFIED TYPE: ICD-10-CM

## 2020-02-18 DIAGNOSIS — G57.01 NEUROPATHY OF RIGHT SCIATIC NERVE: ICD-10-CM

## 2020-02-18 DIAGNOSIS — I10 ESSENTIAL HYPERTENSION: Primary | ICD-10-CM

## 2020-02-18 DIAGNOSIS — G44.209 TENSION HEADACHE: ICD-10-CM

## 2020-02-18 DIAGNOSIS — Z12.31 ENCOUNTER FOR SCREENING MAMMOGRAM FOR MALIGNANT NEOPLASM OF BREAST: ICD-10-CM

## 2020-02-18 DIAGNOSIS — Z12.39 SCREENING FOR MALIGNANT NEOPLASM OF BREAST: ICD-10-CM

## 2020-02-18 LAB
MICROSCOPIC COMMENT: ABNORMAL
RBC #/AREA URNS HPF: 3 /HPF (ref 0–4)
SQUAMOUS #/AREA URNS HPF: 1 /HPF
WBC #/AREA URNS HPF: 1 /HPF (ref 0–5)
WBC CLUMPS URNS QL MICRO: ABNORMAL

## 2020-02-18 PROCEDURE — 99214 OFFICE O/P EST MOD 30 MIN: CPT | Mod: S$GLB,,, | Performed by: FAMILY MEDICINE

## 2020-02-18 PROCEDURE — 81000 URINALYSIS NONAUTO W/SCOPE: CPT

## 2020-02-18 PROCEDURE — 3079F PR MOST RECENT DIASTOLIC BLOOD PRESSURE 80-89 MM HG: ICD-10-PCS | Mod: CPTII,S$GLB,, | Performed by: FAMILY MEDICINE

## 2020-02-18 PROCEDURE — 3077F SYST BP >= 140 MM HG: CPT | Mod: CPTII,S$GLB,, | Performed by: FAMILY MEDICINE

## 2020-02-18 PROCEDURE — 1101F PT FALLS ASSESS-DOCD LE1/YR: CPT | Mod: CPTII,S$GLB,, | Performed by: FAMILY MEDICINE

## 2020-02-18 PROCEDURE — 3008F BODY MASS INDEX DOCD: CPT | Mod: CPTII,S$GLB,, | Performed by: FAMILY MEDICINE

## 2020-02-18 PROCEDURE — 3077F PR MOST RECENT SYSTOLIC BLOOD PRESSURE >= 140 MM HG: ICD-10-PCS | Mod: CPTII,S$GLB,, | Performed by: FAMILY MEDICINE

## 2020-02-18 PROCEDURE — 3079F DIAST BP 80-89 MM HG: CPT | Mod: CPTII,S$GLB,, | Performed by: FAMILY MEDICINE

## 2020-02-18 PROCEDURE — 1101F PR PT FALLS ASSESS DOC 0-1 FALLS W/OUT INJ PAST YR: ICD-10-PCS | Mod: CPTII,S$GLB,, | Performed by: FAMILY MEDICINE

## 2020-02-18 PROCEDURE — 3008F PR BODY MASS INDEX (BMI) DOCUMENTED: ICD-10-PCS | Mod: CPTII,S$GLB,, | Performed by: FAMILY MEDICINE

## 2020-02-18 PROCEDURE — 99214 PR OFFICE/OUTPT VISIT, EST, LEVL IV, 30-39 MIN: ICD-10-PCS | Mod: S$GLB,,, | Performed by: FAMILY MEDICINE

## 2020-02-18 RX ORDER — METHOCARBAMOL 500 MG/1
500 TABLET, FILM COATED ORAL 4 TIMES DAILY PRN
Qty: 40 TABLET | Refills: 0 | Status: SHIPPED | OUTPATIENT
Start: 2020-02-18 | End: 2020-02-28

## 2020-02-18 RX ORDER — HYDROCHLOROTHIAZIDE 12.5 MG/1
12.5 TABLET ORAL DAILY
Qty: 90 TABLET | Refills: 1 | Status: SHIPPED | OUTPATIENT
Start: 2020-02-18 | End: 2020-10-08 | Stop reason: SDUPTHER

## 2020-02-18 NOTE — PROGRESS NOTES
[facility] - Clinic Note    Subjective      Ms. Suresh is a 64 y.o. female who presents to clinic for follow up.    Headaches   - frontal and occipital    Bradycardia  - chronic  - normal TSH  - no dizzy light-headedness    Abdominal pain  - generalized  - no diarrhea/vomiting/melena/hematochezia     LE edema  - no PND/HOGAN    Right sided sciatic nerve pain  - worse with standing, reproducible        PMH Claudia has a past medical history of Cervical spinal stenosis (1/7/2016), Contusion of cervical cord (1/7/2016), Essential hypertension, and Tobacco abuse (1/7/2016).   PSXH Claudia has a past surgical history that includes none; Colonoscopy (12/06/2013); Esophagogastroduodenoscopy (12/06/2013); Neck surgery; Colonoscopy (N/A, 5/1/2018); Laparoscopic cholecystectomy (Bilateral, 6/18/2018); Laparoscopic biopsy of liver (N/A, 6/18/2018); and Cholecystectomy (06/18/2018).   KARENA Taylor's family history includes Cancer in her father and mother; Ovarian cancer in her mother; Stroke in her mother.   ADOLPH Taylor reports that she quit smoking about 4 years ago. Her smoking use included cigarettes. She has a 40.00 pack-year smoking history. She has never used smokeless tobacco. She reports that she drinks alcohol. She reports that she does not use drugs.   RAJIV Taylor has No Known Allergies.   TOI Taylor has a current medication list which includes the following prescription(s): gabapentin and hydrochlorothiazide.     Review of Systems   Constitutional: Negative for fatigue.   Respiratory: Negative for shortness of breath.    Cardiovascular: Negative for chest pain.   Gastrointestinal: Positive for abdominal pain.   Genitourinary: Negative for dysuria.   Musculoskeletal: Positive for arthralgias.   Neurological: Positive for headaches. Negative for dizziness and light-headedness.   Psychiatric/Behavioral: Negative for sleep disturbance.     ROS otherwise negative  Objective     BP (!) 156/88 (BP Location: Right arm,  Patient Position: Sitting, BP Method: Medium (Automatic))   Pulse (!) 59   Temp 98 °F (36.7 °C) (Oral)   Resp 14   Ht 5' (1.524 m)   Wt 52.7 kg (116 lb 3.2 oz)   SpO2 97%   BMI 22.69 kg/m²     Physical Exam   Constitutional: She is oriented to person, place, and time. She appears well-developed and well-nourished. No distress.   HENT:   Head: Normocephalic and atraumatic.   Right Ear: External ear normal.   Left Ear: External ear normal.   Nose: Nose normal.   Mouth/Throat: Oropharynx is clear and moist.   Eyes: Pupils are equal, round, and reactive to light.   Cardiovascular: Regular rhythm, normal heart sounds and intact distal pulses.   bradycardia   Pulmonary/Chest: Effort normal and breath sounds normal. She has no wheezes. She has no rales.   Abdominal: Soft. Bowel sounds are normal. She exhibits no distension. There is no tenderness. There is no guarding.   Musculoskeletal: She exhibits edema.   Neurological: She is alert and oriented to person, place, and time. No cranial nerve deficit.   Skin: She is not diaphoretic.      Assessment/Plan     1. Essential hypertension  Comprehensive metabolic panel    Lipid panel    CBC auto differential    Urinalysis Microscopic    hydroCHLOROthiazide (HYDRODIURIL) 12.5 MG Tab    Urinalysis Microscopic   2. Abdominal pain, unspecified abdominal location     3. Neuropathy of right sciatic nerve     4. Bradycardia     5. Edema, unspecified type  hydroCHLOROthiazide (HYDRODIURIL) 12.5 MG Tab   6. Tension headache  methocarbamol (ROBAXIN) 500 MG Tab   7. Screening for malignant neoplasm of breast  Mammo Digital Screening Bilat w/ Trevor   8. Encounter for screening mammogram for malignant neoplasm of breast   Mammo Digital Screening Bilat w/ [facility]       Monitor abdominal pain  Start hctz 12.5 mg   Robaxin and tylenol for sciatic nerve pain  No follow-ups on file.    Future Appointments   Date Time Provider Department Center   3/19/2020 11:20 AM [facility] [id number] [facility] MAMMO  [facility]       Kendra Bliss MD  Family Medicine  [facility]  [phone]

## 2020-02-19 ENCOUNTER — LAB VISIT (OUTPATIENT)
Dept: LAB | Facility: HOSPITAL | Age: 65
End: 2020-02-19
Attending: FAMILY MEDICINE
Payer: MEDICARE

## 2020-02-19 DIAGNOSIS — I10 ESSENTIAL HYPERTENSION: ICD-10-CM

## 2020-02-19 LAB
ALBUMIN SERPL BCP-MCNC: 4.2 G/DL (ref 3.5–5.2)
ALP SERPL-CCNC: 67 U/L (ref 55–135)
ALT SERPL W/O P-5'-P-CCNC: 14 U/L (ref 10–44)
ANION GAP SERPL CALC-SCNC: 10 MMOL/L (ref 8–16)
AST SERPL-CCNC: 19 U/L (ref 10–40)
BASOPHILS # BLD AUTO: 0.04 K/UL (ref 0–0.2)
BASOPHILS NFR BLD: 0.7 % (ref 0–1.9)
BILIRUB SERPL-MCNC: 0.8 MG/DL (ref 0.1–1)
BUN SERPL-MCNC: 10 MG/DL (ref 8–23)
CALCIUM SERPL-MCNC: 9.2 MG/DL (ref 8.7–10.5)
CHLORIDE SERPL-SCNC: 104 MMOL/L (ref 95–110)
CHOLEST SERPL-MCNC: 194 MG/DL (ref 120–199)
CHOLEST/HDLC SERPL: 2.7 {RATIO} (ref 2–5)
CO2 SERPL-SCNC: 27 MMOL/L (ref 23–29)
CREAT SERPL-MCNC: 0.6 MG/DL (ref 0.5–1.4)
DIFFERENTIAL METHOD: NORMAL
EOSINOPHIL # BLD AUTO: 0 K/UL (ref 0–0.5)
EOSINOPHIL NFR BLD: 0.5 % (ref 0–8)
ERYTHROCYTE [DISTWIDTH] IN BLOOD BY AUTOMATED COUNT: 12.7 % (ref 11.5–14.5)
EST. GFR  (AFRICAN AMERICAN): >60 ML/MIN/1.73 M^2
EST. GFR  (NON AFRICAN AMERICAN): >60 ML/MIN/1.73 M^2
GLUCOSE SERPL-MCNC: 90 MG/DL (ref 70–110)
HCT VFR BLD AUTO: 43 % (ref 37–48.5)
HDLC SERPL-MCNC: 73 MG/DL (ref 40–75)
HDLC SERPL: 37.6 % (ref 20–50)
HGB BLD-MCNC: 14 G/DL (ref 12–16)
IMM GRANULOCYTES # BLD AUTO: 0 K/UL (ref 0–0.04)
IMM GRANULOCYTES NFR BLD AUTO: 0 % (ref 0–0.5)
LDLC SERPL CALC-MCNC: 111.4 MG/DL (ref 63–159)
LYMPHOCYTES # BLD AUTO: 2.1 K/UL (ref 1–4.8)
LYMPHOCYTES NFR BLD: 37.1 % (ref 18–48)
MCH RBC QN AUTO: 30.3 PG (ref 27–31)
MCHC RBC AUTO-ENTMCNC: 32.6 G/DL (ref 32–36)
MCV RBC AUTO: 93 FL (ref 82–98)
MONOCYTES # BLD AUTO: 0.6 K/UL (ref 0.3–1)
MONOCYTES NFR BLD: 10.6 % (ref 4–15)
NEUTROPHILS # BLD AUTO: 2.9 K/UL (ref 1.8–7.7)
NEUTROPHILS NFR BLD: 51.1 % (ref 38–73)
NONHDLC SERPL-MCNC: 121 MG/DL
NRBC BLD-RTO: 0 /100 WBC
PLATELET # BLD AUTO: 208 K/UL (ref 150–350)
PMV BLD AUTO: 9.9 FL (ref 9.2–12.9)
POTASSIUM SERPL-SCNC: 3.9 MMOL/L (ref 3.5–5.1)
PROT SERPL-MCNC: 6.9 G/DL (ref 6–8.4)
RBC # BLD AUTO: 4.62 M/UL (ref 4–5.4)
SODIUM SERPL-SCNC: 141 MMOL/L (ref 136–145)
TRIGL SERPL-MCNC: 48 MG/DL (ref 30–150)
WBC # BLD AUTO: 5.58 K/UL (ref 3.9–12.7)

## 2020-02-19 PROCEDURE — 36415 COLL VENOUS BLD VENIPUNCTURE: CPT

## 2020-02-19 PROCEDURE — 80061 LIPID PANEL: CPT

## 2020-02-19 PROCEDURE — 85025 COMPLETE CBC W/AUTO DIFF WBC: CPT

## 2020-02-19 PROCEDURE — 80053 COMPREHEN METABOLIC PANEL: CPT

## 2020-03-23 ENCOUNTER — TELEPHONE (OUTPATIENT)
Dept: FAMILY MEDICINE | Facility: CLINIC | Age: 65
End: 2020-03-23

## 2020-03-23 NOTE — TELEPHONE ENCOUNTER
----- Message from Kendra Bliss MD sent at 3/17/2020  9:16 AM CDT -----  Please let patient know that all of her lab work is normal. Her cholesterol is slightly elevated which puts her at an increased risk of heart attack, heart disease and stroke. She is on the borderline for needing medication for treatment. We can discuss further at her next visit but in the meantime she should really focus on diet and lifestyle modifications.

## 2020-07-21 PROBLEM — Z78.0 MENOPAUSE: Status: ACTIVE | Noted: 2020-07-21

## 2020-07-21 PROBLEM — F32.9 CURRENT EPISODE OF MAJOR DEPRESSIVE DISORDER WITHOUT PRIOR EPISODE: Status: ACTIVE | Noted: 2020-07-21

## 2020-07-21 PROBLEM — F41.9 ANXIETY: Status: ACTIVE | Noted: 2020-07-21

## 2020-08-13 ENCOUNTER — HOSPITAL ENCOUNTER (EMERGENCY)
Facility: HOSPITAL | Age: 65
Discharge: HOME | End: 2020-08-13
Attending: EMERGENCY MEDICINE
Payer: OTHER MISCELLANEOUS

## 2020-08-13 VITALS
BODY MASS INDEX: 20.39 KG/M2 | DIASTOLIC BLOOD PRESSURE: 87 MMHG | SYSTOLIC BLOOD PRESSURE: 150 MMHG | HEIGHT: 61 IN | OXYGEN SATURATION: 97 % | WEIGHT: 108 LBS | TEMPERATURE: 99 F | RESPIRATION RATE: 17 BRPM | HEART RATE: 56 BPM

## 2020-08-13 DIAGNOSIS — M54.41 BILATERAL LOW BACK PAIN WITH BILATERAL SCIATICA, UNSPECIFIED CHRONICITY: Primary | ICD-10-CM

## 2020-08-13 DIAGNOSIS — R10.9 ABDOMINAL PAIN: ICD-10-CM

## 2020-08-13 DIAGNOSIS — M54.42 BILATERAL LOW BACK PAIN WITH BILATERAL SCIATICA, UNSPECIFIED CHRONICITY: Primary | ICD-10-CM

## 2020-08-13 LAB
BACTERIA #/AREA URNS HPF: NORMAL /HPF
BILIRUB UR QL STRIP: NEGATIVE
CLARITY UR: CLEAR
COLOR UR: YELLOW
GLUCOSE UR QL STRIP: NEGATIVE
HGB UR QL STRIP: ABNORMAL
KETONES UR QL STRIP: NEGATIVE
LEUKOCYTE ESTERASE UR QL STRIP: ABNORMAL
MICROSCOPIC COMMENT: NORMAL
NITRITE UR QL STRIP: NEGATIVE
PH UR STRIP: 6 [PH] (ref 5–8)
PROT UR QL STRIP: NEGATIVE
RBC #/AREA URNS HPF: 3 /HPF (ref 0–4)
SP GR UR STRIP: <=1.005 (ref 1–1.03)
SQUAMOUS #/AREA URNS HPF: NORMAL /HPF
URN SPEC COLLECT METH UR: ABNORMAL
UROBILINOGEN UR STRIP-ACNC: NEGATIVE EU/DL
WBC #/AREA URNS HPF: 2 /HPF (ref 0–5)

## 2020-08-13 PROCEDURE — 72100 XR LUMBAR SPINE AP AND LATERAL: ICD-10-PCS | Mod: 26,,, | Performed by: RADIOLOGY

## 2020-08-13 PROCEDURE — 72100 X-RAY EXAM L-S SPINE 2/3 VWS: CPT | Mod: TC,FY

## 2020-08-13 PROCEDURE — 74018 RADEX ABDOMEN 1 VIEW: CPT | Mod: TC,FY

## 2020-08-13 PROCEDURE — 74018 RADEX ABDOMEN 1 VIEW: CPT | Mod: 26,,, | Performed by: RADIOLOGY

## 2020-08-13 PROCEDURE — 81000 URINALYSIS NONAUTO W/SCOPE: CPT

## 2020-08-13 PROCEDURE — 74018 XR ABDOMEN AP 1 VIEW: ICD-10-PCS | Mod: 26,,, | Performed by: RADIOLOGY

## 2020-08-13 PROCEDURE — 96372 THER/PROPH/DIAG INJ SC/IM: CPT

## 2020-08-13 PROCEDURE — 63600175 PHARM REV CODE 636 W HCPCS: Performed by: EMERGENCY MEDICINE

## 2020-08-13 PROCEDURE — 72100 X-RAY EXAM L-S SPINE 2/3 VWS: CPT | Mod: 26,,, | Performed by: RADIOLOGY

## 2020-08-13 PROCEDURE — 99284 EMERGENCY DEPT VISIT MOD MDM: CPT | Mod: 25

## 2020-08-13 RX ORDER — KETOROLAC TROMETHAMINE 30 MG/ML
30 INJECTION, SOLUTION INTRAMUSCULAR; INTRAVENOUS
Status: COMPLETED | OUTPATIENT
Start: 2020-08-13 | End: 2020-08-13

## 2020-08-13 RX ORDER — HYDROCODONE BITARTRATE AND ACETAMINOPHEN 5; 325 MG/1; MG/1
1 TABLET ORAL EVERY 4 HOURS PRN
Qty: 8 TABLET | Refills: 0 | Status: SHIPPED | OUTPATIENT
Start: 2020-08-13 | End: 2020-10-08

## 2020-08-13 RX ADMIN — KETOROLAC TROMETHAMINE 30 MG: 30 INJECTION, SOLUTION INTRAMUSCULAR at 10:08

## 2020-08-13 NOTE — DISCHARGE INSTRUCTIONS
Take Tylenol and Motrin for discomfort.  For unrelieved pain, take Norco.  Remember Norco may make you unsteady on your feet.  Do not drive or drink alcohol after taking Norco.  Follow-up with Dr. Bliss tomorrow, and return here as needed or if worse in any way.

## 2020-08-13 NOTE — ED NOTES
1st contact with pt, care assumed, lying semi heredia in stretcher, A&Ox3, in no distress, noted facial grimacing upon getting out of stretcher, reports she has urge to urinate, assisted in getting up from stretcher and escorted to restoom

## 2020-08-13 NOTE — ED PROVIDER NOTES
Encounter Date: 8/13/2020       History     Chief Complaint   Patient presents with    Back Pain     hx of sciatica to the right side. Patient reports lower back pain that radiates down the left leg and pain to the pelvic region. Pain 10/10. Described as a constant hurt     64-year-old female here via private vehicle from home complaining of 2 day history of crampy abdominal pain which radiates into both thighs.  Patient has a history of some sort of neck injury 4 years ago which required surgery.  She states that for 2 days, she was paralyzed on the right side of her body, but after the surgery, her paralysis resolved.  She has some residual paresthesias in the right leg as well as some slight weakness there.  Denies any history of lumbar injury or pain.  She denies any new leg weakness or numbness.  Denies any difficulty passing urine or controlling urine, and denies any difficulty controlling her bowels.  No fever or chills.  No overexertion or straining recently.  Has not contacted her primary care provider about these symptoms.  She states that she has to take MiraLax almost daily to prevent constipation.         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    Essential hypertension     Tobacco abuse 1/7/2016     Past Surgical History:   Procedure Laterality Date    CHOLECYSTECTOMY  06/18/2018    COLONOSCOPY  12/06/2013    COLONOSCOPY N/A 5/1/2018    Procedure: COLONOSCOPY;  Surgeon: Deven Boyd MD;  Location: [facility] ENDO;  Service: Endoscopy;  Laterality: N/A;    ESOPHAGOGASTRODUODENOSCOPY  12/06/2013    LAPAROSCOPIC BIOPSY OF LIVER N/A 6/18/2018    Procedure: BIOPSY, LIVER, LAPAROSCOPIC;  Surgeon: Deven Boyd MD;  Location: [facility] OR;  Service: General;  Laterality: N/A;    LAPAROSCOPIC CHOLECYSTECTOMY Bilateral 6/18/2018    Procedure:  CHOLECYSTECTOMY-LAPAROSCOPIC;  Surgeon: Deven Boyd MD;  Location: [facility] OR;  Service: General;  Laterality: Bilateral;    NECK SURGERY      C4-5, C5-6 fusion; discectomy     none       Family History   Problem Relation Age of Onset    Stroke Mother     Ovarian cancer Mother     Cancer Mother     Cancer Father      Social History     Tobacco Use    Smoking status: Former Smoker     Packs/day: 1.00     Years: 40.00     Pack years: 40.00     Types: Cigarettes     Quit date:      Years since quittin.6    Smokeless tobacco: Never Used   Substance Use Topics    Alcohol use: Yes     Comment: beer occasional    Drug use: No     Review of Systems   Constitutional: Negative for chills and fever.   HENT: Negative for congestion, rhinorrhea and sore throat.    Eyes: Negative for photophobia, redness and visual disturbance.   Respiratory: Negative for cough, chest tightness and shortness of breath.    Cardiovascular: Negative for chest pain, palpitations and leg swelling.   Gastrointestinal: Positive for abdominal pain. Negative for blood in stool, constipation, diarrhea, nausea and vomiting.   Endocrine: Negative for polydipsia and polyuria.   Genitourinary: Negative for decreased urine volume, difficulty urinating, dysuria, enuresis, flank pain, frequency, hematuria, pelvic pain and urgency.   Musculoskeletal: Positive for back pain. Negative for myalgias, neck pain and neck stiffness.   Skin: Negative for pallor and rash.   Neurological: Negative for dizziness, syncope, weakness, light-headedness and numbness.   Psychiatric/Behavioral: Negative for confusion and decreased concentration. The patient is not nervous/anxious.        Physical Exam     Initial Vitals [20 0847]   BP Pulse Resp Temp SpO2   (!) 153/111 70 20 98.8 °F (37.1 °C) 96 %      MAP       --         Physical Exam    Nursing note and vitals reviewed.  Constitutional: She appears well-developed and well-nourished. No distress.    HENT:   Head: Normocephalic and atraumatic.   Nose: Nose normal.   Mouth/Throat: No oropharyngeal exudate.   Neck: Normal range of motion. Neck supple. No JVD present.   Cardiovascular: Normal rate, regular rhythm, normal heart sounds and intact distal pulses.   No murmur heard.  Pulmonary/Chest: Breath sounds normal. No stridor. No respiratory distress. She has no wheezes.   Abdominal: Soft. Bowel sounds are normal. She exhibits no distension. There is no abdominal tenderness. There is no rebound and no guarding.   Musculoskeletal: Normal range of motion. Tenderness present. No edema.      Comments: Mild tenderness to palpation over the lower lumbar region, no bony abnormalities.  No excess warmth, no erythema, no ecchymosis, no rash   Neurological: She is alert and oriented to person, place, and time. She has normal strength and normal reflexes. She displays normal reflexes. No cranial nerve deficit or sensory deficit. GCS score is 15. GCS eye subscore is 4. GCS verbal subscore is 5. GCS motor subscore is 6.   Patient describes some slight paresthesias of the right lower leg, which is chronic since her accident 4 years ago.  She denies any new numbness or tingling in the left lower extremity.  Patient is able to lift her legs to 45° independently while standing, and also when lying supine on the bed.  She can rock on her heels and toes.  She has no foot drop.  DTRs are normal.  Good sensory function both lower extremities.  She has no bowel or bladder issues, and denies any saddle anesthesia.  Patient is able to sit up, swing her legs to the edge of the bed, stand, and ambulate without any obvious difficulty.  This does cause her discomfort in the lower back and in the anterior of both thighs.   Skin: Skin is warm and dry. Capillary refill takes less than 2 seconds. No rash noted. No erythema. No pallor.   Psychiatric: She has a normal mood and affect. Her behavior is normal.         ED Course   Procedures  Labs  Reviewed   URINALYSIS, REFLEX TO URINE CULTURE - Abnormal; Notable for the following components:       Result Value    Occult Blood UA Trace (*)     Leukocytes, UA Trace (*)     All other components within normal limits    Narrative:     Specimen Source->Urine   URINALYSIS MICROSCOPIC    Narrative:     Specimen Source->Urine          Imaging Results          X-Ray Lumbar Spine Ap And Lateral (Final result)  Result time 08/13/20 10:38:04    Final result by Yanique William MD (08/13/20 10:38:04)                 Impression:      No acute abnormality.  Bony demineralization and multilevel degenerative changes.      Electronically signed by: Yanique William  Date:    08/13/2020  Time:    10:38             Narrative:    EXAMINATION:  XR LUMBAR SPINE AP AND LATERAL    CLINICAL HISTORY:  Back pain or radiculopathy, osteoporosis presence or risk;    TECHNIQUE:  AP, lateral and spot images were performed of the lumbar spine.    COMPARISON:  MRI of the lumbar spine 01/06/2016, bone windows of the CT of the abdomen and pelvis 11/14/2018    FINDINGS:  There are 5 lumbar type vertebra.  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  There is slight levoscoliosis, but otherwise normal alignment.  There is no fracture or subluxation.  Bones are demineralized.  There are scattered osteophytes at the disc spaces.  There are multilevel facet degenerative changes.  Extensive calcific plaque is noted in the aorta.                               X-Ray Abdomen AP 1 View (KUB) (Final result)  Result time 08/13/20 10:26:16    Final result by Yanique William MD (08/13/20 10:26:16)                 Impression:      Normal bowel gas pattern.      Electronically signed by: Yanique William  Date:    08/13/2020  Time:    10:26             Narrative:    EXAMINATION:  XR ABDOMEN AP 1 VIEW    CLINICAL HISTORY:  Unspecified abdominal pain    TECHNIQUE:  AP View(s) of the abdomen was performed.    COMPARISON:  CT 11/14/2018    FINDINGS:  Two supine  views of the abdomen demonstrate a nonobstructive bowel gas pattern.  Right upper quadrant cholecystectomy clips noted.  Lung bases are clear.  Scattered degenerative changes noted spine and pelvis.  Extensive arterial vascular calcification is present.                                 Medical Decision Making:   Differential Diagnosis:   Compression fracture, sciatica, diskitis, abscess, muscle strain, UTI, etc.  ED Management:  Lab work shows no apparent UTI, and patient denies any urinary symptoms.  Is no physical evidence of diskitis and patient denies any fever or chills.  She denies any recent exertion or straining, and states she woke up with these symptoms.  Next phrase the lumbar spine, personally reviewed by me, shows no evidence of fracture dislocation.  Arthritic and degenerative changes only.  X-ray of the abdomen shows no evidence of constipation etc..  Unsure the exact etiology of the patient's discomfort, but I suspect acute on chronic low back pain with sciatica, likely musculoskeletal, and I do not suspect any more ominous illness such as diskitis, abscess, etc..  No evidence of cauda equina syndrome.  I believe that she is safe for discharge home and follow-up with her primary care provider.  Will provide low-dose Norco for unrelieved pain, and patient will follow-up with her doctor in a few days, but will return here if worse in any way, especially if she develops any signs or symptoms of cauda equina, which we discussed.                                 Clinical Impression:       ICD-10-CM ICD-9-CM   1. Bilateral low back pain with bilateral sciatica, unspecified chronicity  M54.42 724.3    M54.41 724.2   2. Abdominal pain  R10.9 789.00             ED Disposition Condition    Discharge Stable        ED Prescriptions     None        Follow-up Information     Follow up With Specialties Details Why Contact Info    Kendra Bliss MD Family Medicine In 1 day  [address] [facility] [place]  [address]  [phone]      [facility] - ED Emergency Medicine  If symptoms worsen [address] [facility] [address] [phone]                                     Leo Story MD  08/13/20 6230

## 2020-08-15 LAB — HEMOCCULT STL QL IA: NEGATIVE

## 2020-08-18 ENCOUNTER — OFFICE VISIT (OUTPATIENT)
Dept: SPINE | Facility: CLINIC | Age: 65
End: 2020-08-18
Payer: COMMERCIAL

## 2020-08-18 VITALS
HEIGHT: 61 IN | SYSTOLIC BLOOD PRESSURE: 156 MMHG | BODY MASS INDEX: 20.39 KG/M2 | WEIGHT: 108 LBS | HEART RATE: 65 BPM | DIASTOLIC BLOOD PRESSURE: 86 MMHG | RESPIRATION RATE: 16 BRPM

## 2020-08-18 DIAGNOSIS — M54.16 RIGHT LUMBAR RADICULITIS: Primary | ICD-10-CM

## 2020-08-18 PROCEDURE — 99204 PR OFFICE/OUTPT VISIT, NEW, LEVL IV, 45-59 MIN: ICD-10-PCS | Mod: 25,S$GLB,, | Performed by: PHYSICAL MEDICINE & REHABILITATION

## 2020-08-18 PROCEDURE — 96372 PR INJECTION,THERAP/PROPH/DIAG2ST, IM OR SUBCUT: ICD-10-PCS | Mod: S$GLB,,, | Performed by: PHYSICAL MEDICINE & REHABILITATION

## 2020-08-18 PROCEDURE — 99204 OFFICE O/P NEW MOD 45 MIN: CPT | Mod: 25,S$GLB,, | Performed by: PHYSICAL MEDICINE & REHABILITATION

## 2020-08-18 PROCEDURE — 96372 THER/PROPH/DIAG INJ SC/IM: CPT | Mod: S$GLB,,, | Performed by: PHYSICAL MEDICINE & REHABILITATION

## 2020-08-18 RX ORDER — METHYLPREDNISOLONE ACETATE 40 MG/ML
40 INJECTION, SUSPENSION INTRA-ARTICULAR; INTRALESIONAL; INTRAMUSCULAR; SOFT TISSUE
Status: COMPLETED | OUTPATIENT
Start: 2020-08-18 | End: 2020-08-18

## 2020-08-18 RX ORDER — METHYLPREDNISOLONE 4 MG/1
TABLET ORAL
Qty: 1 PACKAGE | Refills: 0 | Status: SHIPPED | OUTPATIENT
Start: 2020-08-18 | End: 2020-09-03 | Stop reason: ALTCHOICE

## 2020-08-18 RX ADMIN — METHYLPREDNISOLONE ACETATE 40 MG: 40 INJECTION, SUSPENSION INTRA-ARTICULAR; INTRALESIONAL; INTRAMUSCULAR; SOFT TISSUE at 02:08

## 2020-08-18 NOTE — PROGRESS NOTES
SUBJECTIVE:    Patient ID: Claudia Suresh is a 64 y.o. female.    Chief Complaint: Back Pain    This is a 64-year-old woman who sees Dr. Bliss for primary care.  Other than hypertension she denies any chronic major medical problems.  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  Long history of back pain.  Presented to the emergency department on 08/13/2020 with a 2 day history of lower abdominal pain radiating into both eyes.  Given a shot of Toradol in the office in release on Norco 5.  She does not feel as this she has improved.  Presents to me with a primary complaint of right-sided low back pain at the lumbosacral junction was radiating right leg pain into the posterolateral and anterior portion of the right leg into the dorsum and plantar portion of the right foot.  No associated weakness bowel or bladder dysfunction fever chills sweats or unexpected weight loss.  Pain level is 8/10.  She had x-rays in the emergency department which show mild degenerative changes with no acute findings.  X-rays of the abdomen were likewise benign.  She is not interested in taking the hydrocodone for pain.  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    Essential hypertension     Tobacco abuse 1/7/2016     Social History     Socioeconomic History    Marital status:      Spouse name: Not on file    Number of children: Not on file    Years of education: Not on file    Highest education level: Not on file   Occupational History    Not on file   Social Needs    Financial resource strain: Not on file    Food insecurity     Worry: Not on file     Inability: Not on file    Transportation needs     Medical: Not on file     Non-medical: Not on file   Tobacco Use    Smoking status: Former Smoker     Packs/day:  "1.00     Years: 40.00     Pack years: 40.00     Types: Cigarettes     Quit date:      Years since quittin.6    Smokeless tobacco: Never Used   Substance and Sexual Activity    Alcohol use: Yes     Comment: beer occasional    Drug use: No    Sexual activity: Yes     Partners: Male   Lifestyle    Physical activity     Days per week: Not on file     Minutes per session: Not on file    Stress: Not on file   Relationships    Social connections     Talks on phone: Not on file     Gets together: Not on file     Attends Church service: Not on file     Active member of club or organization: Not on file     Attends meetings of clubs or organizations: Not on file     Relationship status: Not on file   Other Topics Concern    Not on file   Social History Narrative    Not on file     Past Surgical History:   Procedure Laterality Date    CHOLECYSTECTOMY  2018    COLONOSCOPY  2013    COLONOSCOPY N/A 2018    Procedure: COLONOSCOPY;  Surgeon: Deven Boyd MD;  Location: [facility] ENDO;  Service: Endoscopy;  Laterality: N/A;    ESOPHAGOGASTRODUODENOSCOPY  2013    LAPAROSCOPIC BIOPSY OF LIVER N/A 2018    Procedure: BIOPSY, LIVER, LAPAROSCOPIC;  Surgeon: Deven Boyd MD;  Location: [facility] OR;  Service: General;  Laterality: N/A;    LAPAROSCOPIC CHOLECYSTECTOMY Bilateral 2018    Procedure: CHOLECYSTECTOMY-LAPAROSCOPIC;  Surgeon: Deven Boyd MD;  Location: [facility] OR;  Service: General;  Laterality: Bilateral;    NECK SURGERY      C4-5, C5-6 fusion; discectomy     none       Family History   Problem Relation Age of Onset    Stroke Mother     Ovarian cancer Mother     Cancer Mother     Cancer Father      Vitals:    20 1414   BP: (!) 156/86   Pulse: 65   Resp: 16   Weight: 49 kg (108 lb)   Height: 5' 1" (1.549 m)       Review of Systems   Constitutional: Negative for chills, diaphoresis, fatigue, fever and unexpected weight change.   HENT: Negative " for trouble swallowing.    Eyes: Negative for visual disturbance.   Respiratory: Negative for shortness of breath.    Cardiovascular: Negative for chest pain.   Gastrointestinal: Negative for abdominal pain, constipation, nausea and vomiting.   Genitourinary: Negative for difficulty urinating.   Musculoskeletal: Negative for arthralgias, back pain, gait problem, joint swelling, myalgias, neck pain and neck stiffness.   Neurological: Negative for dizziness, speech difficulty, weakness, light-headedness, numbness and headaches.          Objective:      Physical Exam  Neurological:      Mental Status: She is alert and oriented to person, place, and time.      Comments: She is awake and in no acute distress  No point tenderness or palpable masses about the lumbar spine  Forward flexion is to about 60° before she complains of pain at the lumbosacral junction.  Extension causes no pain  She can heel and toe walk normally  Deep tendon reflexes +2 at the knees and +1 at the ankles  Strength is normal in both lower extremities  Straight leg raising on the right reproduces right leg pain.  Straight leg raising on left negative             Assessment:       1. Right lumbar radiculitis           Plan:     she has a nonfocal examination from a neurological standpoint and no historical red flags.  Has symptoms of right L5/S1 radiculitis with no evidence of nerve root dysfunction.  Going to give her shot of Depo-Medrol followed by Medrol Dosepak.  Follow-up in 1 week.  Consider MRI of the lumbar spine and subsequent epidural steroid injection.  Do note that she has seen pain management in the past .  I am requesting his records.  She apparently has had epidural steroid injections in the past with no significant improvement but I do not have the details      Right lumbar radiculitis    Other orders  -     methylPREDNISolone acetate injection 40 mg  -     methylPREDNISolone (MEDROL DOSEPACK) 4 mg tablet; use as directed   Dispense: 1 Package; Refill: 0

## 2020-08-31 ENCOUNTER — PATIENT OUTREACH (OUTPATIENT)
Dept: ADMINISTRATIVE | Facility: OTHER | Age: 65
End: 2020-08-31

## 2020-08-31 NOTE — PROGRESS NOTES
Chart was reviewed for overdue [facility] Encounters ([facility])  topics  Updates were requested from care everywhere  Health Maintenance has been updated  LINKS immunization registry triggered

## 2020-09-01 ENCOUNTER — HOSPITAL ENCOUNTER (OUTPATIENT)
Dept: RADIOLOGY | Facility: HOSPITAL | Age: 65
Discharge: HOME | End: 2020-09-01
Attending: FAMILY MEDICINE
Payer: MEDICARE

## 2020-09-01 VITALS — WEIGHT: 108 LBS | BODY MASS INDEX: 20.39 KG/M2 | HEIGHT: 61 IN

## 2020-09-01 DIAGNOSIS — Z12.39 SCREENING FOR MALIGNANT NEOPLASM OF BREAST: ICD-10-CM

## 2020-09-01 DIAGNOSIS — Z12.31 ENCOUNTER FOR SCREENING MAMMOGRAM FOR MALIGNANT NEOPLASM OF BREAST: ICD-10-CM

## 2020-09-01 PROCEDURE — 77067 SCR MAMMO BI INCL CAD: CPT | Mod: TC

## 2020-09-01 PROCEDURE — 77067 MAMMO DIGITAL SCREENING BILAT WITH TOMOSYNTHESIS_CAD: ICD-10-PCS | Mod: 26,,, | Performed by: RADIOLOGY

## 2020-09-01 PROCEDURE — 77063 MAMMO DIGITAL SCREENING BILAT WITH TOMOSYNTHESIS_CAD: ICD-10-PCS | Mod: 26,,, | Performed by: RADIOLOGY

## 2020-09-01 PROCEDURE — 77063 BREAST TOMOSYNTHESIS BI: CPT | Mod: 26,,, | Performed by: RADIOLOGY

## 2020-09-01 PROCEDURE — 77067 SCR MAMMO BI INCL CAD: CPT | Mod: 26,,, | Performed by: RADIOLOGY

## 2020-09-03 ENCOUNTER — OFFICE VISIT (OUTPATIENT)
Dept: SPINE | Facility: CLINIC | Age: 65
End: 2020-09-03
Payer: OTHER MISCELLANEOUS

## 2020-09-03 VITALS
HEART RATE: 61 BPM | BODY MASS INDEX: 20.39 KG/M2 | SYSTOLIC BLOOD PRESSURE: 153 MMHG | DIASTOLIC BLOOD PRESSURE: 85 MMHG | HEIGHT: 61 IN | WEIGHT: 108 LBS

## 2020-09-03 DIAGNOSIS — M54.16 RIGHT LUMBAR RADICULITIS: Primary | ICD-10-CM

## 2020-09-03 PROCEDURE — 99203 OFFICE O/P NEW LOW 30 MIN: CPT | Mod: S$GLB,,, | Performed by: PHYSICAL MEDICINE & REHABILITATION

## 2020-09-03 PROCEDURE — 99203 PR OFFICE/OUTPT VISIT, NEW, LEVL III, 30-44 MIN: ICD-10-PCS | Mod: S$GLB,,, | Performed by: PHYSICAL MEDICINE & REHABILITATION

## 2020-09-03 RX ORDER — METHYLPREDNISOLONE ACETATE 40 MG/ML
40 INJECTION, SUSPENSION INTRA-ARTICULAR; INTRALESIONAL; INTRAMUSCULAR; SOFT TISSUE ONCE
Status: DISCONTINUED | OUTPATIENT
Start: 2020-09-03 | End: 2020-10-08

## 2020-09-03 NOTE — PROGRESS NOTES
SUBJECTIVE:    Patient ID: Claudia Suresh is a 64 y.o. female.    Chief Complaint: Follow-up (1 week)    She is here for one-week follow-up.  She believes that the Depo-Medrol injection and subsequent Medrol Dosepak helped her however for the past 4 5 days she has had a return of discomfort.  Pain is radiating down the right leg and now more clearly in a right L5 radicular pattern.  Pain level is 7/10.  She has no new or progressive problems.  I note she is on gabapentin now.  Does help.        Past Medical History:   Diagnosis Date    Cervical spinal stenosis 2016    Contusion of cervical cord 2016    Essential hypertension     Tobacco abuse 2016     Social History     Socioeconomic History    Marital status:      Spouse name: Not on file    Number of children: Not on file    Years of education: Not on file    Highest education level: Not on file   Occupational History    Not on file   Social Needs    Financial resource strain: Not on file    Food insecurity     Worry: Not on file     Inability: Not on file    Transportation needs     Medical: Not on file     Non-medical: Not on file   Tobacco Use    Smoking status: Former Smoker     Packs/day: 1.00     Years: 40.00     Pack years: 40.00     Types: Cigarettes     Quit date: 2016     Years since quittin.6    Smokeless tobacco: Never Used   Substance and Sexual Activity    Alcohol use: Yes     Comment: beer occasional    Drug use: No    Sexual activity: Yes     Partners: Male   Lifestyle    Physical activity     Days per week: Not on file     Minutes per session: Not on file    Stress: Not on file   Relationships    Social connections     Talks on phone: Not on file     Gets together: Not on file     Attends L[employer] service: Not on file     Active member of club or organization: Not on file     Attends meetings of clubs or organizations: Not on file     Relationship status: Not on file   Other Topics Concern    Not  "on file   Social History Narrative    Not on file     Past Surgical History:   Procedure Laterality Date    CHOLECYSTECTOMY  06/18/2018    COLONOSCOPY  12/06/2013    COLONOSCOPY N/A 5/1/2018    Procedure: COLONOSCOPY;  Surgeon: Deven Boyd MD;  Location: [facility] ENDO;  Service: Endoscopy;  Laterality: N/A;    ESOPHAGOGASTRODUODENOSCOPY  12/06/2013    LAPAROSCOPIC BIOPSY OF LIVER N/A 6/18/2018    Procedure: BIOPSY, LIVER, LAPAROSCOPIC;  Surgeon: Deven Boyd MD;  Location: [facility] OR;  Service: General;  Laterality: N/A;    LAPAROSCOPIC CHOLECYSTECTOMY Bilateral 6/18/2018    Procedure: CHOLECYSTECTOMY-LAPAROSCOPIC;  Surgeon: Deven Boyd MD;  Location: [facility] OR;  Service: General;  Laterality: Bilateral;    NECK SURGERY      C4-5, C5-6 fusion; discectomy     none       Family History   Problem Relation Age of Onset    Stroke Mother     Ovarian cancer Mother     Cancer Mother     Cancer Father     Breast cancer Maternal Aunt     Breast cancer Maternal Aunt      Vitals:    09/03/20 1050   BP: (!) 153/85   Pulse: 61   Weight: 49 kg (108 lb 0.4 oz)   Height: 5' 1" (1.549 m)       Review of Systems   Constitutional: Negative for chills, diaphoresis, fatigue, fever and unexpected weight change.   HENT: Negative for trouble swallowing.    Eyes: Negative for visual disturbance.   Respiratory: Negative for shortness of breath.    Cardiovascular: Negative for chest pain.   Gastrointestinal: Negative for abdominal pain, constipation, nausea and vomiting.   Genitourinary: Negative for difficulty urinating.   Musculoskeletal: Negative for arthralgias, back pain, gait problem, joint swelling, myalgias, neck pain and neck stiffness.   Neurological: Negative for dizziness, speech difficulty, weakness, light-headedness, numbness and headaches.          Objective:      Physical Exam  Neurological:      Mental Status: She is alert and oriented to person, place, and time.             Assessment:    "    1. Right lumbar radiculitis           Plan:     will repeat the Depo-Medrol injection.  MRI of the lumbar spine in preparation for epidural steroid injection.  She is interested in trying lumbar traction again through accelerated physical therapy.  She had variable results before she presented here.  I have no objection to that.  Follow-up with me after the procedure      Right lumbar radiculitis  -     MRI Lumbar Spine Without Contrast; Future; Expected date: 09/03/2020    Other orders  -     methylPREDNISolone acetate injection 40 mg

## 2020-09-25 ENCOUNTER — HOSPITAL ENCOUNTER (OUTPATIENT)
Dept: RADIOLOGY | Facility: HOSPITAL | Age: 65
Discharge: HOME | End: 2020-09-25
Attending: PHYSICAL MEDICINE & REHABILITATION
Payer: COMMERCIAL

## 2020-09-25 DIAGNOSIS — M54.16 RIGHT LUMBAR RADICULITIS: ICD-10-CM

## 2020-09-25 PROCEDURE — 72148 MRI LUMBAR SPINE W/O DYE: CPT | Mod: TC

## 2020-09-25 PROCEDURE — 72148 MRI LUMBAR SPINE W/O DYE: CPT | Mod: 26,,, | Performed by: RADIOLOGY

## 2020-09-25 PROCEDURE — 72148 MRI LUMBAR SPINE WITHOUT CONTRAST: ICD-10-PCS | Mod: 26,,, | Performed by: RADIOLOGY

## 2020-10-08 ENCOUNTER — OFFICE VISIT (OUTPATIENT)
Dept: FAMILY MEDICINE | Facility: CLINIC | Age: 65
End: 2020-10-08
Payer: MEDICARE

## 2020-10-08 VITALS
RESPIRATION RATE: 16 BRPM | TEMPERATURE: 97 F | SYSTOLIC BLOOD PRESSURE: 140 MMHG | HEART RATE: 56 BPM | BODY MASS INDEX: 19.73 KG/M2 | DIASTOLIC BLOOD PRESSURE: 89 MMHG | HEIGHT: 61 IN | WEIGHT: 104.5 LBS | OXYGEN SATURATION: 97 %

## 2020-10-08 DIAGNOSIS — R10.13 EPIGASTRIC PAIN: ICD-10-CM

## 2020-10-08 DIAGNOSIS — G89.29 CHRONIC RIGHT-SIDED LOW BACK PAIN WITH RIGHT-SIDED SCIATICA: ICD-10-CM

## 2020-10-08 DIAGNOSIS — M54.41 CHRONIC RIGHT-SIDED LOW BACK PAIN WITH RIGHT-SIDED SCIATICA: ICD-10-CM

## 2020-10-08 DIAGNOSIS — Z87.891 PERSONAL HISTORY OF NICOTINE DEPENDENCE: ICD-10-CM

## 2020-10-08 DIAGNOSIS — Z12.2 ENCOUNTER FOR SCREENING FOR MALIGNANT NEOPLASM OF RESPIRATORY ORGANS: ICD-10-CM

## 2020-10-08 DIAGNOSIS — Z78.0 ASYMPTOMATIC MENOPAUSAL STATE: ICD-10-CM

## 2020-10-08 DIAGNOSIS — I10 ESSENTIAL HYPERTENSION: Primary | ICD-10-CM

## 2020-10-08 PROCEDURE — 99214 PR OFFICE/OUTPT VISIT, EST, LEVL IV, 30-39 MIN: ICD-10-PCS | Mod: 25,S$GLB,, | Performed by: FAMILY MEDICINE

## 2020-10-08 PROCEDURE — 99214 OFFICE O/P EST MOD 30 MIN: CPT | Mod: 25,S$GLB,, | Performed by: FAMILY MEDICINE

## 2020-10-08 PROCEDURE — 3008F BODY MASS INDEX DOCD: CPT | Mod: CPTII,S$GLB,, | Performed by: FAMILY MEDICINE

## 2020-10-08 PROCEDURE — 3079F PR MOST RECENT DIASTOLIC BLOOD PRESSURE 80-89 MM HG: ICD-10-PCS | Mod: CPTII,S$GLB,, | Performed by: FAMILY MEDICINE

## 2020-10-08 PROCEDURE — 3077F PR MOST RECENT SYSTOLIC BLOOD PRESSURE >= 140 MM HG: ICD-10-PCS | Mod: CPTII,S$GLB,, | Performed by: FAMILY MEDICINE

## 2020-10-08 PROCEDURE — 1101F PT FALLS ASSESS-DOCD LE1/YR: CPT | Mod: CPTII,S$GLB,, | Performed by: FAMILY MEDICINE

## 2020-10-08 PROCEDURE — 3077F SYST BP >= 140 MM HG: CPT | Mod: CPTII,S$GLB,, | Performed by: FAMILY MEDICINE

## 2020-10-08 PROCEDURE — 3008F PR BODY MASS INDEX (BMI) DOCUMENTED: ICD-10-PCS | Mod: CPTII,S$GLB,, | Performed by: FAMILY MEDICINE

## 2020-10-08 PROCEDURE — 96372 PR INJECTION,THERAP/PROPH/DIAG2ST, IM OR SUBCUT: ICD-10-PCS | Mod: S$GLB,,, | Performed by: FAMILY MEDICINE

## 2020-10-08 PROCEDURE — 96372 THER/PROPH/DIAG INJ SC/IM: CPT | Mod: S$GLB,,, | Performed by: FAMILY MEDICINE

## 2020-10-08 PROCEDURE — 1101F PR PT FALLS ASSESS DOC 0-1 FALLS W/OUT INJ PAST YR: ICD-10-PCS | Mod: CPTII,S$GLB,, | Performed by: FAMILY MEDICINE

## 2020-10-08 PROCEDURE — 3079F DIAST BP 80-89 MM HG: CPT | Mod: CPTII,S$GLB,, | Performed by: FAMILY MEDICINE

## 2020-10-08 RX ORDER — HYDROCHLOROTHIAZIDE 12.5 MG/1
12.5 TABLET ORAL DAILY
Qty: 90 TABLET | Refills: 1 | Status: SHIPPED | OUTPATIENT
Start: 2020-10-08 | End: 2020-12-22 | Stop reason: DRUGHIGH

## 2020-10-08 RX ORDER — PANTOPRAZOLE SODIUM 40 MG/1
40 TABLET, DELAYED RELEASE ORAL DAILY
Qty: 30 TABLET | Refills: 11 | Status: SHIPPED | OUTPATIENT
Start: 2020-10-08 | End: 2021-05-18 | Stop reason: SDUPTHER

## 2020-10-08 RX ORDER — DEXAMETHASONE SODIUM PHOSPHATE 4 MG/ML
4 INJECTION, SOLUTION INTRA-ARTICULAR; INTRALESIONAL; INTRAMUSCULAR; INTRAVENOUS; SOFT TISSUE
Status: COMPLETED | OUTPATIENT
Start: 2020-10-08 | End: 2020-10-08

## 2020-10-08 RX ORDER — METHYLPREDNISOLONE 4 MG/1
TABLET ORAL
Qty: 1 PACKAGE | Refills: 0 | Status: SHIPPED | OUTPATIENT
Start: 2020-10-08 | End: 2020-10-29

## 2020-10-08 RX ORDER — KETOROLAC TROMETHAMINE 30 MG/ML
60 INJECTION, SOLUTION INTRAMUSCULAR; INTRAVENOUS ONCE
Status: COMPLETED | OUTPATIENT
Start: 2020-10-08 | End: 2020-10-08

## 2020-10-08 RX ADMIN — DEXAMETHASONE SODIUM PHOSPHATE 4 MG: 4 INJECTION, SOLUTION INTRA-ARTICULAR; INTRALESIONAL; INTRAMUSCULAR; INTRAVENOUS; SOFT TISSUE at 02:10

## 2020-10-08 RX ADMIN — KETOROLAC TROMETHAMINE 60 MG: 30 INJECTION, SOLUTION INTRAMUSCULAR; INTRAVENOUS at 02:10

## 2020-10-08 NOTE — PROGRESS NOTES
[facility] - Clinic Note    Subjective      Ms. Suresh is a 64 y.o. female who presents to clinic for medication refill.   Patient was previously seen by Dr. Bliss.     HTN: currently on HCTZ. Reports adherence to med.   BP today is 140/89    Complains of abdominal pain.   Noted in the upper stomach.   Intermittent and burning in nature.   associated with abdominal fullness.  Unsure if worse with food.   Denies nausea or vomiting.     Has a history of lower back pain with sciatic.   Is having a flare with pain down her right leg.   Present for a couple days.   Denies saddle anesthesia or bladder/bowel incontinence.     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   PSXH Claudia has a past surgical history that includes none; Colonoscopy (12/06/2013); Esophagogastroduodenoscopy (12/06/2013); Neck surgery; Colonoscopy (N/A, 5/1/2018); Laparoscopic cholecystectomy (Bilateral, 6/18/2018); Laparoscopic biopsy of liver (N/A, 6/18/2018); and Cholecystectomy (06/18/2018).    Claudia's family history includes Breast cancer in her maternal aunt and maternal aunt; Cancer in her father and mother; Ovarian cancer in her mother; Stroke in her mother.   ADOLPH Taylor reports that she quit smoking about 4 years ago. Her smoking use included cigarettes. She has a 40.00 pack-year smoking history. She has never used smokeless tobacco. She reports current alcohol use. She reports that she does not use drugs.   RAJIV Taylor has No Known Allergies.   TOI Taylor has a current medication list which includes the following prescription(s): buspirone, fluoxetine, gabapentin, hydrochlorothiazide, methylprednisolone, and pantoprazole.     Review of Systems   Constitutional: Negative for activity change, appetite change, chills, fatigue and fever.   Eyes: Negative for visual disturbance.   Respiratory: Negative  "for cough and shortness of breath.    Cardiovascular: Negative for chest pain, palpitations and leg swelling.   Gastrointestinal: Negative for abdominal pain, diarrhea and vomiting.   Musculoskeletal: Positive for back pain.   Skin: Negative for wound.   Neurological: Negative for dizziness and headaches.   Psychiatric/Behavioral: Negative for confusion.     Objective     BP (!) 140/89   Pulse (!) 56   Temp 96.8 °F (36 °C) (Temporal)   Resp 16   Ht 5' 1" (1.549 m)   Wt 47.4 kg (104 lb 8 oz)   SpO2 97%   BMI 19.75 kg/m²     Physical Exam   Constitutional: She appears well-developed and well-nourished.  Non-toxic appearance. She does not appear ill. No distress.   HENT:   Head: Normocephalic and atraumatic.   Eyes: Right eye exhibits no discharge. Left eye exhibits no discharge.   Neck: Neck supple.   Cardiovascular: Normal rate, regular rhythm, normal heart sounds and normal pulses. Exam reveals no gallop and no friction rub.   No murmur heard.  Pulmonary/Chest: Effort normal and breath sounds normal. No respiratory distress. She has no wheezes. She has no rhonchi. She has no rales.   Abdominal: Soft. Normal appearance and bowel sounds are normal. She exhibits no distension and no mass. There is no abdominal tenderness. There is no rebound and no guarding.   Lymphadenopathy:     She has no cervical adenopathy.   Neurological: She is alert.   Skin: Skin is warm and dry. Capillary refill takes less than 2 seconds. She is not diaphoretic.   Psychiatric: Her behavior is normal. Mood, judgment and thought content normal.   Vitals reviewed.     Assessment/Plan     Claudia was seen today for medication refill.    Diagnoses and all orders for this visit:  -New patient and new problem to me    Essential hypertension  -     hydroCHLOROthiazide (HYDRODIURIL) 12.5 MG Tab; Take 1 tablet (12.5 mg total) by mouth once daily.  -     Comprehensive Metabolic Panel; Future  - Continue HCTZ.    Epigastric pain  -     H. pylori " antigen, stool; Future  -     pantoprazole (PROTONIX) 40 MG tablet; Take 1 tablet (40 mg total) by mouth once daily.  - Counseled to keep a food diary to monitor symptoms.    Asymptomatic menopausal state  -     DXA Bone Density Spine And Hip; Future    Chronic right-sided low back pain with right-sided sciatica  -     ketorolac injection 60 mg  -     dexamethasone injection 4 mg  -     methylPREDNISolone (MEDROL DOSEPACK) 4 mg tablet; use as directed  - Ice packs and back/sciatic stretches    Encounter for screening for malignant neoplasm of respiratory organs  -     CT Chest Lung Screening Low Dose; Future    Personal history of nicotine dependence   -     CT Chest Lung Screening Low Dose; Future    Follow up in about 4 weeks (around 11/5/2020).    Future Appointments   Date Time Provider Department Center   10/20/2020  2:00 PM [facility], LABORATORY [facility] LAB [facility]   11/10/2020  9:20 AM Dolores Quiñonez MD [facility]       Dolores Quiñonez MD  Family Medicine  [facility]

## 2020-10-12 ENCOUNTER — LAB VISIT (OUTPATIENT)
Dept: LAB | Facility: HOSPITAL | Age: 65
End: 2020-10-12
Attending: FAMILY MEDICINE
Payer: MEDICARE

## 2020-10-12 DIAGNOSIS — I10 ESSENTIAL HYPERTENSION: ICD-10-CM

## 2020-10-12 DIAGNOSIS — R10.13 EPIGASTRIC PAIN: ICD-10-CM

## 2020-10-12 LAB
ALBUMIN SERPL BCP-MCNC: 4.3 G/DL (ref 3.5–5.2)
ALP SERPL-CCNC: 64 U/L (ref 55–135)
ALT SERPL W/O P-5'-P-CCNC: 17 U/L (ref 10–44)
ANION GAP SERPL CALC-SCNC: 11 MMOL/L (ref 8–16)
AST SERPL-CCNC: 21 U/L (ref 10–40)
BILIRUB SERPL-MCNC: 0.5 MG/DL (ref 0.1–1)
BUN SERPL-MCNC: 13 MG/DL (ref 8–23)
CALCIUM SERPL-MCNC: 9.4 MG/DL (ref 8.7–10.5)
CHLORIDE SERPL-SCNC: 97 MMOL/L (ref 95–110)
CO2 SERPL-SCNC: 31 MMOL/L (ref 23–29)
CREAT SERPL-MCNC: 0.6 MG/DL (ref 0.5–1.4)
EST. GFR  (AFRICAN AMERICAN): >60 ML/MIN/1.73 M^2
EST. GFR  (NON AFRICAN AMERICAN): >60 ML/MIN/1.73 M^2
GLUCOSE SERPL-MCNC: 98 MG/DL (ref 70–110)
POTASSIUM SERPL-SCNC: 3.3 MMOL/L (ref 3.5–5.1)
PROT SERPL-MCNC: 7.2 G/DL (ref 6–8.4)
SODIUM SERPL-SCNC: 139 MMOL/L (ref 136–145)

## 2020-10-12 PROCEDURE — 80053 COMPREHEN METABOLIC PANEL: CPT

## 2020-10-12 PROCEDURE — 36415 COLL VENOUS BLD VENIPUNCTURE: CPT

## 2020-10-14 ENCOUNTER — TELEPHONE (OUTPATIENT)
Dept: FAMILY MEDICINE | Facility: CLINIC | Age: 65
End: 2020-10-14

## 2020-10-14 NOTE — TELEPHONE ENCOUNTER
Left message for patient to call in regards to the stool sample recollect. Wanted to inform her to collect a formed sample. Lab stated the last sample was too loose. It needs to be formed stool. X3    Attempted to contact, unable to reach x2    Attempted to contact, unable to reach. x1        Patient collected stool sample for H. plori and dropped it off at lab. Nathaniel from the lab stated it was only and small drop about the size if his finger nail and it was watery. He needs firm solid stool and a larger amount. He is asking if you want to cancel? Or can she recollect if and when her stool is more solid. Please advise. Thank you

## 2020-10-19 ENCOUNTER — TELEPHONE (OUTPATIENT)
Dept: FAMILY MEDICINE | Facility: CLINIC | Age: 65
End: 2020-10-19

## 2020-10-19 DIAGNOSIS — K29.90 GASTRITIS AND DUODENITIS: Primary | ICD-10-CM

## 2020-10-19 DIAGNOSIS — R10.84 GENERALIZED ABDOMINAL PAIN: ICD-10-CM

## 2020-10-19 NOTE — TELEPHONE ENCOUNTER
Spoke with patient in regards to stool sample. Will come to the lab tomorrow to obtain another kit for recollection.

## 2020-10-19 NOTE — TELEPHONE ENCOUNTER
----- Message from Tammy Perez sent at 10/19/2020  8:41 AM CDT -----  Type:  Patient Returning Call    Who Called:  Patient  Who Left Message for Patient:  Vania  Does the patient know what this is regarding?:  Stool sample  Best Call Back Number:  [phone]  Additional Information:

## 2020-10-20 ENCOUNTER — TELEPHONE (OUTPATIENT)
Dept: FAMILY MEDICINE | Facility: CLINIC | Age: 65
End: 2020-10-20

## 2020-10-20 NOTE — TELEPHONE ENCOUNTER
10/21/2020 10:40AM-- Spoke with patient, she stated she really has not had any changes in her diet. She drinks plenty of water and no OTC meds. She stated her BP this morning was 142/85. I instructed her to continue monitoring BP, be aware of the amount of salt intake, continue drinking plenty of water. She has an upcomming appt with Dr Mullen on 11/10/2020. I asked her to bring the bp log to discuss. Also asked her to let us know if she has any questions to please let us know. She will continue to monitor bp and will let us know. Patient voiced understanding.        10/20/2020 4:17pm-She stated her blood pressure have been running high. For the last few days.   BP's 161/91 and 173/94 etc. She takes her blood pressure medication every morning. I told her I would send message to Dr Mullen to advise and to keep recording BP. Patient voiced understanding.          Patient picked up a stool supplies to collect stool this morning. I informed her to try to get a sample that is formed. They can not do a test on stool that is loose. Patient stated she will try her best. She voiced understanding.             ----- Message from Belia Chavez Patient Care Assistant sent at 10/20/2020  9:24 AM CDT -----  Regarding: return call  Contact: patient  Type:  Patient Returning Call    Who Called:  patient  Who Left Message for Patient:  unknown  Does the patient know what this is regarding?:  ?  Best Call Back Number:  [phone]    Additional Information:  patient is returning a message dont know who called or what is reguards. Please call to advise , thanks

## 2020-10-21 PROCEDURE — 87338 HPYLORI STOOL AG IA: CPT

## 2020-10-28 LAB — H PYLORI AG STL QL IA: NOT DETECTED

## 2020-11-10 ENCOUNTER — OFFICE VISIT (OUTPATIENT)
Dept: FAMILY MEDICINE | Facility: CLINIC | Age: 65
End: 2020-11-10
Payer: MEDICARE

## 2020-11-10 VITALS
WEIGHT: 110 LBS | RESPIRATION RATE: 16 BRPM | BODY MASS INDEX: 20.77 KG/M2 | HEIGHT: 61 IN | TEMPERATURE: 98 F | SYSTOLIC BLOOD PRESSURE: 128 MMHG | OXYGEN SATURATION: 98 % | DIASTOLIC BLOOD PRESSURE: 82 MMHG | HEART RATE: 56 BPM

## 2020-11-10 DIAGNOSIS — I10 ESSENTIAL HYPERTENSION: ICD-10-CM

## 2020-11-10 DIAGNOSIS — R10.13 EPIGASTRIC PAIN: Primary | ICD-10-CM

## 2020-11-10 DIAGNOSIS — F43.21 GRIEF: ICD-10-CM

## 2020-11-10 PROCEDURE — 3079F PR MOST RECENT DIASTOLIC BLOOD PRESSURE 80-89 MM HG: ICD-10-PCS | Mod: CPTII,S$GLB,, | Performed by: FAMILY MEDICINE

## 2020-11-10 PROCEDURE — 3288F FALL RISK ASSESSMENT DOCD: CPT | Mod: CPTII,S$GLB,, | Performed by: FAMILY MEDICINE

## 2020-11-10 PROCEDURE — 1101F PT FALLS ASSESS-DOCD LE1/YR: CPT | Mod: CPTII,S$GLB,, | Performed by: FAMILY MEDICINE

## 2020-11-10 PROCEDURE — 3079F DIAST BP 80-89 MM HG: CPT | Mod: CPTII,S$GLB,, | Performed by: FAMILY MEDICINE

## 2020-11-10 PROCEDURE — 1159F PR MEDICATION LIST DOCUMENTED IN MEDICAL RECORD: ICD-10-PCS | Mod: S$GLB,,, | Performed by: FAMILY MEDICINE

## 2020-11-10 PROCEDURE — 1126F PR PAIN SEVERITY QUANTIFIED, NO PAIN PRESENT: ICD-10-PCS | Mod: S$GLB,,, | Performed by: FAMILY MEDICINE

## 2020-11-10 PROCEDURE — 3288F PR FALLS RISK ASSESSMENT DOCUMENTED: ICD-10-PCS | Mod: CPTII,S$GLB,, | Performed by: FAMILY MEDICINE

## 2020-11-10 PROCEDURE — 3008F BODY MASS INDEX DOCD: CPT | Mod: CPTII,S$GLB,, | Performed by: FAMILY MEDICINE

## 2020-11-10 PROCEDURE — 1101F PR PT FALLS ASSESS DOC 0-1 FALLS W/OUT INJ PAST YR: ICD-10-PCS | Mod: CPTII,S$GLB,, | Performed by: FAMILY MEDICINE

## 2020-11-10 PROCEDURE — 3074F PR MOST RECENT SYSTOLIC BLOOD PRESSURE < 130 MM HG: ICD-10-PCS | Mod: CPTII,S$GLB,, | Performed by: FAMILY MEDICINE

## 2020-11-10 PROCEDURE — 3074F SYST BP LT 130 MM HG: CPT | Mod: CPTII,S$GLB,, | Performed by: FAMILY MEDICINE

## 2020-11-10 PROCEDURE — 99214 OFFICE O/P EST MOD 30 MIN: CPT | Mod: S$GLB,,, | Performed by: FAMILY MEDICINE

## 2020-11-10 PROCEDURE — 99214 PR OFFICE/OUTPT VISIT, EST, LEVL IV, 30-39 MIN: ICD-10-PCS | Mod: S$GLB,,, | Performed by: FAMILY MEDICINE

## 2020-11-10 PROCEDURE — 1126F AMNT PAIN NOTED NONE PRSNT: CPT | Mod: S$GLB,,, | Performed by: FAMILY MEDICINE

## 2020-11-10 PROCEDURE — 3008F PR BODY MASS INDEX (BMI) DOCUMENTED: ICD-10-PCS | Mod: CPTII,S$GLB,, | Performed by: FAMILY MEDICINE

## 2020-11-10 PROCEDURE — 1159F MED LIST DOCD IN RCRD: CPT | Mod: S$GLB,,, | Performed by: FAMILY MEDICINE

## 2020-11-10 NOTE — PROGRESS NOTES
[facility] - Clinic Note    Subjective      Ms. Suresh is a 65 y.o. female who presents to clinic for a follow up.     Patient was seen 1 month ago to transition care.     Hypertension:  Currently on hydrochlorothiazide.  Blood pressure well controlled.  Patient compliant with medication.    Epigastric pain:  Patient complained of this at her last visit 1 month ago.  H pylori test obtained which was negative.  She was started on Protonix 40 mg daily.  Reports that this has helped resolve her epigastric pain.    Patient reports having lost her  about a week ago.  States that she is doing okay at this time.  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   PSXH Claudia has a past surgical history that includes none; Colonoscopy (12/06/2013); Esophagogastroduodenoscopy (12/06/2013); Neck surgery; Colonoscopy (N/A, 5/1/2018); Laparoscopic cholecystectomy (Bilateral, 6/18/2018); Laparoscopic biopsy of liver (N/A, 6/18/2018); and Cholecystectomy (06/18/2018).   KARENA Talyor's family history includes Breast cancer in her maternal aunt and maternal aunt; Cancer in her father and mother; Ovarian cancer in her mother; Stroke in her mother.   ADOLPH Taylor reports that she quit smoking about 4 years ago. Her smoking use included cigarettes. She has a 40.00 pack-year smoking history. She has never used smokeless tobacco. She reports current alcohol use. She reports that she does not use drugs.   RAJIV Taylor has No Known Allergies.   TOI Taylor has a current medication list which includes the following prescription(s): buspirone, gabapentin, hydrochlorothiazide, pantoprazole, potassium, and fluoxetine.     Review of Systems   Constitutional: Negative for activity change, appetite change, chills, fatigue and fever.   Eyes: Negative for visual disturbance.   Respiratory: Negative  "for cough and shortness of breath.    Cardiovascular: Negative for chest pain, palpitations and leg swelling.   Gastrointestinal: Negative for abdominal pain.   Neurological: Negative for dizziness and headaches.   Psychiatric/Behavioral: Negative for confusion.     Objective     /82 (BP Location: Left arm, Patient Position: Sitting, BP Method: Medium (Automatic))   Pulse (!) 56   Temp 97.8 °F (36.6 °C) (Temporal)   Resp 16   Ht 5' 1" (1.549 m)   Wt 49.9 kg (110 lb)   SpO2 98%   BMI 20.78 kg/m²     Physical Exam   Constitutional: She appears well-developed and well-nourished.  Non-toxic appearance. She does not appear ill. No distress.   HENT:   Head: Normocephalic and atraumatic.   Eyes: Right eye exhibits no discharge. Left eye exhibits no discharge.   Neck: Neck supple.   Cardiovascular: Normal rate, regular rhythm, normal heart sounds and normal pulses. Exam reveals no gallop and no friction rub.   No murmur heard.  Pulmonary/Chest: Effort normal and breath sounds normal. No respiratory distress. She has no wheezes. She has no rhonchi. She has no rales.   Abdominal: Normal appearance.   Musculoskeletal:      Right lower leg: No edema.      Left lower leg: No edema.   Lymphadenopathy:     She has no cervical adenopathy.   Neurological: She is alert.   Skin: Skin is warm and dry. Capillary refill takes less than 2 seconds. She is not diaphoretic.   Psychiatric: Her behavior is normal. Mood, judgment and thought content normal.   Vitals reviewed.     Assessment/Plan     Claudia was seen today for follow-up.    Diagnoses and all orders for this visit:    Epigastric pain  -improved with protonix. Continue prn    Essential hypertension  -well controlled. Nochange in med regimen. Counseled to check BP at home a couple times a week and bring log into next visit. Counseled patient a low sodium diet and moderate intensity exercise 30 mins daily.    Grief  -informed to let me know if she is needing a " therapist. Counseled on grief and coping skills.       Follow up in about 3 months (around 2/10/2021).    Future Appointments   Date Time Provider Department Center   2/10/2021  8:40 AM Dolores Quiñonez MD [facility]       Dolores Quiñonez MD  Family Medicine  [facility]

## 2020-11-20 ENCOUNTER — PATIENT OUTREACH (OUTPATIENT)
Dept: FAMILY MEDICINE | Facility: CLINIC | Age: 65
End: 2020-11-20

## 2020-12-14 ENCOUNTER — OFFICE VISIT (OUTPATIENT)
Dept: FAMILY MEDICINE | Facility: CLINIC | Age: 65
End: 2020-12-14
Payer: MEDICARE

## 2020-12-14 VITALS
WEIGHT: 111.38 LBS | SYSTOLIC BLOOD PRESSURE: 160 MMHG | HEART RATE: 58 BPM | BODY MASS INDEX: 21.03 KG/M2 | HEIGHT: 61 IN | TEMPERATURE: 97 F | RESPIRATION RATE: 17 BRPM | DIASTOLIC BLOOD PRESSURE: 86 MMHG | OXYGEN SATURATION: 97 %

## 2020-12-14 DIAGNOSIS — I10 ESSENTIAL HYPERTENSION: ICD-10-CM

## 2020-12-14 DIAGNOSIS — M54.32 LEFT SCIATIC NERVE PAIN: ICD-10-CM

## 2020-12-14 DIAGNOSIS — Z23 NEED FOR PNEUMOCOCCAL VACCINE: ICD-10-CM

## 2020-12-14 DIAGNOSIS — R30.0 DYSURIA: Primary | ICD-10-CM

## 2020-12-14 LAB
BACTERIA #/AREA URNS HPF: ABNORMAL /HPF
BILIRUB UR QL STRIP: NEGATIVE
CLARITY UR: CLEAR
COLOR UR: YELLOW
GLUCOSE UR QL STRIP: NEGATIVE
HGB UR QL STRIP: ABNORMAL
KETONES UR QL STRIP: NEGATIVE
LEUKOCYTE ESTERASE UR QL STRIP: ABNORMAL
MICROSCOPIC COMMENT: ABNORMAL
NITRITE UR QL STRIP: NEGATIVE
PH UR STRIP: 7 [PH] (ref 5–8)
PROT UR QL STRIP: NEGATIVE
RBC #/AREA URNS HPF: 10 /HPF (ref 0–4)
SP GR UR STRIP: 1.02 (ref 1–1.03)
URN SPEC COLLECT METH UR: ABNORMAL
UROBILINOGEN UR STRIP-ACNC: NEGATIVE EU/DL
WBC #/AREA URNS HPF: 3 /HPF (ref 0–5)

## 2020-12-14 PROCEDURE — 1101F PT FALLS ASSESS-DOCD LE1/YR: CPT | Mod: CPTII,S$GLB,, | Performed by: FAMILY MEDICINE

## 2020-12-14 PROCEDURE — 3077F PR MOST RECENT SYSTOLIC BLOOD PRESSURE >= 140 MM HG: ICD-10-PCS | Mod: CPTII,S$GLB,, | Performed by: FAMILY MEDICINE

## 2020-12-14 PROCEDURE — 90732 PNEUMOCOCCAL POLYSACCHARIDE VACCINE 23-VALENT =>2YO SQ IM: ICD-10-PCS | Mod: S$GLB,,, | Performed by: FAMILY MEDICINE

## 2020-12-14 PROCEDURE — 99214 OFFICE O/P EST MOD 30 MIN: CPT | Mod: 25,S$GLB,, | Performed by: FAMILY MEDICINE

## 2020-12-14 PROCEDURE — G0009 PNEUMOCOCCAL POLYSACCHARIDE VACCINE 23-VALENT =>2YO SQ IM: ICD-10-PCS | Mod: 59,S$GLB,, | Performed by: FAMILY MEDICINE

## 2020-12-14 PROCEDURE — 81000 URINALYSIS NONAUTO W/SCOPE: CPT

## 2020-12-14 PROCEDURE — 1101F PR PT FALLS ASSESS DOC 0-1 FALLS W/OUT INJ PAST YR: ICD-10-PCS | Mod: CPTII,S$GLB,, | Performed by: FAMILY MEDICINE

## 2020-12-14 PROCEDURE — 3008F PR BODY MASS INDEX (BMI) DOCUMENTED: ICD-10-PCS | Mod: CPTII,S$GLB,, | Performed by: FAMILY MEDICINE

## 2020-12-14 PROCEDURE — 1125F AMNT PAIN NOTED PAIN PRSNT: CPT | Mod: S$GLB,,, | Performed by: FAMILY MEDICINE

## 2020-12-14 PROCEDURE — G0009 ADMIN PNEUMOCOCCAL VACCINE: HCPCS | Mod: 59,S$GLB,, | Performed by: FAMILY MEDICINE

## 2020-12-14 PROCEDURE — 1159F PR MEDICATION LIST DOCUMENTED IN MEDICAL RECORD: ICD-10-PCS | Mod: S$GLB,,, | Performed by: FAMILY MEDICINE

## 2020-12-14 PROCEDURE — 3077F SYST BP >= 140 MM HG: CPT | Mod: CPTII,S$GLB,, | Performed by: FAMILY MEDICINE

## 2020-12-14 PROCEDURE — 3288F PR FALLS RISK ASSESSMENT DOCUMENTED: ICD-10-PCS | Mod: CPTII,S$GLB,, | Performed by: FAMILY MEDICINE

## 2020-12-14 PROCEDURE — 96372 THER/PROPH/DIAG INJ SC/IM: CPT | Mod: S$GLB,,, | Performed by: FAMILY MEDICINE

## 2020-12-14 PROCEDURE — 3079F DIAST BP 80-89 MM HG: CPT | Mod: CPTII,S$GLB,, | Performed by: FAMILY MEDICINE

## 2020-12-14 PROCEDURE — 1125F PR PAIN SEVERITY QUANTIFIED, PAIN PRESENT: ICD-10-PCS | Mod: S$GLB,,, | Performed by: FAMILY MEDICINE

## 2020-12-14 PROCEDURE — 1159F MED LIST DOCD IN RCRD: CPT | Mod: S$GLB,,, | Performed by: FAMILY MEDICINE

## 2020-12-14 PROCEDURE — 99214 PR OFFICE/OUTPT VISIT, EST, LEVL IV, 30-39 MIN: ICD-10-PCS | Mod: 25,S$GLB,, | Performed by: FAMILY MEDICINE

## 2020-12-14 PROCEDURE — 3288F FALL RISK ASSESSMENT DOCD: CPT | Mod: CPTII,S$GLB,, | Performed by: FAMILY MEDICINE

## 2020-12-14 PROCEDURE — 3008F BODY MASS INDEX DOCD: CPT | Mod: CPTII,S$GLB,, | Performed by: FAMILY MEDICINE

## 2020-12-14 PROCEDURE — 3079F PR MOST RECENT DIASTOLIC BLOOD PRESSURE 80-89 MM HG: ICD-10-PCS | Mod: CPTII,S$GLB,, | Performed by: FAMILY MEDICINE

## 2020-12-14 PROCEDURE — 90732 PPSV23 VACC 2 YRS+ SUBQ/IM: CPT | Mod: S$GLB,,, | Performed by: FAMILY MEDICINE

## 2020-12-14 PROCEDURE — 96372 PR INJECTION,THERAP/PROPH/DIAG2ST, IM OR SUBCUT: ICD-10-PCS | Mod: S$GLB,,, | Performed by: FAMILY MEDICINE

## 2020-12-14 RX ORDER — DEXAMETHASONE SODIUM PHOSPHATE 4 MG/ML
4 INJECTION, SOLUTION INTRA-ARTICULAR; INTRALESIONAL; INTRAMUSCULAR; INTRAVENOUS; SOFT TISSUE
Status: COMPLETED | OUTPATIENT
Start: 2020-12-14 | End: 2020-12-14

## 2020-12-14 RX ORDER — GABAPENTIN 400 MG/1
400 CAPSULE ORAL 3 TIMES DAILY
Qty: 90 CAPSULE | Refills: 3 | Status: SHIPPED | OUTPATIENT
Start: 2020-12-14 | End: 2021-12-14

## 2020-12-14 RX ORDER — KETOROLAC TROMETHAMINE 30 MG/ML
60 INJECTION, SOLUTION INTRAMUSCULAR; INTRAVENOUS
Status: COMPLETED | OUTPATIENT
Start: 2020-12-14 | End: 2020-12-14

## 2020-12-14 RX ORDER — NITROFURANTOIN 25; 75 MG/1; MG/1
100 CAPSULE ORAL 2 TIMES DAILY
Qty: 14 CAPSULE | Refills: 0 | Status: SHIPPED | OUTPATIENT
Start: 2020-12-14 | End: 2020-12-21

## 2020-12-14 RX ORDER — METHYLPREDNISOLONE 4 MG/1
TABLET ORAL
Qty: 1 PACKAGE | Refills: 0 | Status: SHIPPED | OUTPATIENT
Start: 2020-12-14 | End: 2021-01-04

## 2020-12-14 RX ADMIN — KETOROLAC TROMETHAMINE 60 MG: 30 INJECTION, SOLUTION INTRAMUSCULAR; INTRAVENOUS at 03:12

## 2020-12-14 RX ADMIN — DEXAMETHASONE SODIUM PHOSPHATE 4 MG: 4 INJECTION, SOLUTION INTRA-ARTICULAR; INTRALESIONAL; INTRAMUSCULAR; INTRAVENOUS; SOFT TISSUE at 03:12

## 2020-12-15 ENCOUNTER — TELEPHONE (OUTPATIENT)
Dept: FAMILY MEDICINE | Facility: CLINIC | Age: 65
End: 2020-12-15

## 2020-12-15 NOTE — TELEPHONE ENCOUNTER
Patient contacted the office in regards to her BP. She states after she took her medication this AM it was 113/68.  Will update chart.

## 2020-12-18 ENCOUNTER — TELEPHONE (OUTPATIENT)
Dept: FAMILY MEDICINE | Facility: CLINIC | Age: 65
End: 2020-12-18

## 2020-12-18 DIAGNOSIS — I10 ESSENTIAL HYPERTENSION: Primary | ICD-10-CM

## 2020-12-18 NOTE — TELEPHONE ENCOUNTER
12/22/2020  Patient stated she takes the BP medication HCTZ. She will take 2 of the 12.5mg and continue to record her Bps to send to us via portal. She will be out of town next week and will need the refill when she returns.  She scheduled an appt to discuss bp and weight on 01/08/2021.       12/22/2020  11:21AM-- Spoke with patient to inform her to increase her HCTZ from 12.5mg to 25mg. While informing patient of this she stated she has not taken the fluid pills in a long time.     She uses [facility] in [place]. Please advise thanks      --------------------------------------------------------------------------  12/18/2020 Patient sent home BP readings. Please advise. Thanks        ----- Message from Fran Melton sent at 12/18/2020  9:08 AM CST -----  Regarding: Blood Pressure reading  Contact: Patient  Patient calling to give blood pressure reading, any questions please call back at [phone] (home)       December 15th 143/85  December 16th 137/76  December 17th 138/81  December 18th 145/83

## 2020-12-22 RX ORDER — HYDROCHLOROTHIAZIDE 25 MG/1
25 TABLET ORAL DAILY
Qty: 90 TABLET | Refills: 2 | Status: SHIPPED | OUTPATIENT
Start: 2020-12-22 | End: 2021-05-18 | Stop reason: SDUPTHER

## 2021-01-04 ENCOUNTER — TELEPHONE (OUTPATIENT)
Dept: FAMILY MEDICINE | Facility: CLINIC | Age: 66
End: 2021-01-04

## 2021-01-21 ENCOUNTER — OFFICE VISIT (OUTPATIENT)
Dept: FAMILY MEDICINE | Facility: CLINIC | Age: 66
End: 2021-01-21
Payer: MEDICARE

## 2021-01-21 ENCOUNTER — LAB VISIT (OUTPATIENT)
Dept: LAB | Facility: HOSPITAL | Age: 66
End: 2021-01-21
Attending: FAMILY MEDICINE
Payer: MEDICARE

## 2021-01-21 VITALS
SYSTOLIC BLOOD PRESSURE: 120 MMHG | HEART RATE: 70 BPM | DIASTOLIC BLOOD PRESSURE: 74 MMHG | WEIGHT: 112.38 LBS | RESPIRATION RATE: 17 BRPM | TEMPERATURE: 98 F | HEIGHT: 61 IN | BODY MASS INDEX: 21.22 KG/M2 | OXYGEN SATURATION: 97 %

## 2021-01-21 DIAGNOSIS — I10 ESSENTIAL HYPERTENSION: Primary | ICD-10-CM

## 2021-01-21 DIAGNOSIS — I10 ESSENTIAL HYPERTENSION: ICD-10-CM

## 2021-01-21 DIAGNOSIS — M54.31 RIGHT SCIATIC NERVE PAIN: ICD-10-CM

## 2021-01-21 LAB
ANION GAP SERPL CALC-SCNC: 9 MMOL/L (ref 8–16)
BUN SERPL-MCNC: 11 MG/DL (ref 8–23)
CALCIUM SERPL-MCNC: 9.7 MG/DL (ref 8.7–10.5)
CHLORIDE SERPL-SCNC: 100 MMOL/L (ref 95–110)
CO2 SERPL-SCNC: 31 MMOL/L (ref 23–29)
CREAT SERPL-MCNC: 0.4 MG/DL (ref 0.5–1.4)
EST. GFR  (AFRICAN AMERICAN): >60 ML/MIN/1.73 M^2
EST. GFR  (NON AFRICAN AMERICAN): >60 ML/MIN/1.73 M^2
GLUCOSE SERPL-MCNC: 86 MG/DL (ref 70–110)
POTASSIUM SERPL-SCNC: 4 MMOL/L (ref 3.5–5.1)
SODIUM SERPL-SCNC: 140 MMOL/L (ref 136–145)

## 2021-01-21 PROCEDURE — 3288F PR FALLS RISK ASSESSMENT DOCUMENTED: ICD-10-PCS | Mod: CPTII,S$GLB,, | Performed by: FAMILY MEDICINE

## 2021-01-21 PROCEDURE — 1159F PR MEDICATION LIST DOCUMENTED IN MEDICAL RECORD: ICD-10-PCS | Mod: S$GLB,,, | Performed by: FAMILY MEDICINE

## 2021-01-21 PROCEDURE — 1101F PR PT FALLS ASSESS DOC 0-1 FALLS W/OUT INJ PAST YR: ICD-10-PCS | Mod: CPTII,S$GLB,, | Performed by: FAMILY MEDICINE

## 2021-01-21 PROCEDURE — 3074F SYST BP LT 130 MM HG: CPT | Mod: CPTII,S$GLB,, | Performed by: FAMILY MEDICINE

## 2021-01-21 PROCEDURE — 96372 PR INJECTION,THERAP/PROPH/DIAG2ST, IM OR SUBCUT: ICD-10-PCS | Mod: S$GLB,,, | Performed by: FAMILY MEDICINE

## 2021-01-21 PROCEDURE — 1125F PR PAIN SEVERITY QUANTIFIED, PAIN PRESENT: ICD-10-PCS | Mod: S$GLB,,, | Performed by: FAMILY MEDICINE

## 2021-01-21 PROCEDURE — 3078F PR MOST RECENT DIASTOLIC BLOOD PRESSURE < 80 MM HG: ICD-10-PCS | Mod: CPTII,S$GLB,, | Performed by: FAMILY MEDICINE

## 2021-01-21 PROCEDURE — 3008F BODY MASS INDEX DOCD: CPT | Mod: CPTII,S$GLB,, | Performed by: FAMILY MEDICINE

## 2021-01-21 PROCEDURE — 3078F DIAST BP <80 MM HG: CPT | Mod: CPTII,S$GLB,, | Performed by: FAMILY MEDICINE

## 2021-01-21 PROCEDURE — 80048 BASIC METABOLIC PNL TOTAL CA: CPT

## 2021-01-21 PROCEDURE — 1125F AMNT PAIN NOTED PAIN PRSNT: CPT | Mod: S$GLB,,, | Performed by: FAMILY MEDICINE

## 2021-01-21 PROCEDURE — 3288F FALL RISK ASSESSMENT DOCD: CPT | Mod: CPTII,S$GLB,, | Performed by: FAMILY MEDICINE

## 2021-01-21 PROCEDURE — 1159F MED LIST DOCD IN RCRD: CPT | Mod: S$GLB,,, | Performed by: FAMILY MEDICINE

## 2021-01-21 PROCEDURE — 3074F PR MOST RECENT SYSTOLIC BLOOD PRESSURE < 130 MM HG: ICD-10-PCS | Mod: CPTII,S$GLB,, | Performed by: FAMILY MEDICINE

## 2021-01-21 PROCEDURE — 36415 COLL VENOUS BLD VENIPUNCTURE: CPT

## 2021-01-21 PROCEDURE — 96372 THER/PROPH/DIAG INJ SC/IM: CPT | Mod: S$GLB,,, | Performed by: FAMILY MEDICINE

## 2021-01-21 PROCEDURE — 99214 OFFICE O/P EST MOD 30 MIN: CPT | Mod: 25,S$GLB,, | Performed by: FAMILY MEDICINE

## 2021-01-21 PROCEDURE — 3008F PR BODY MASS INDEX (BMI) DOCUMENTED: ICD-10-PCS | Mod: CPTII,S$GLB,, | Performed by: FAMILY MEDICINE

## 2021-01-21 PROCEDURE — 1101F PT FALLS ASSESS-DOCD LE1/YR: CPT | Mod: CPTII,S$GLB,, | Performed by: FAMILY MEDICINE

## 2021-01-21 PROCEDURE — 99214 PR OFFICE/OUTPT VISIT, EST, LEVL IV, 30-39 MIN: ICD-10-PCS | Mod: 25,S$GLB,, | Performed by: FAMILY MEDICINE

## 2021-01-21 RX ORDER — DEXAMETHASONE SODIUM PHOSPHATE 4 MG/ML
4 INJECTION, SOLUTION INTRA-ARTICULAR; INTRALESIONAL; INTRAMUSCULAR; INTRAVENOUS; SOFT TISSUE
Status: COMPLETED | OUTPATIENT
Start: 2021-01-21 | End: 2021-01-21

## 2021-01-21 RX ADMIN — DEXAMETHASONE SODIUM PHOSPHATE 4 MG: 4 INJECTION, SOLUTION INTRA-ARTICULAR; INTRALESIONAL; INTRAMUSCULAR; INTRAVENOUS; SOFT TISSUE at 10:01

## 2021-01-25 ENCOUNTER — TELEPHONE (OUTPATIENT)
Dept: FAMILY MEDICINE | Facility: CLINIC | Age: 66
End: 2021-01-25

## 2021-03-04 DIAGNOSIS — Z11.59 NEED FOR HEPATITIS C SCREENING TEST: ICD-10-CM

## 2021-05-18 DIAGNOSIS — I10 ESSENTIAL HYPERTENSION: ICD-10-CM

## 2021-05-18 DIAGNOSIS — R10.13 EPIGASTRIC PAIN: ICD-10-CM

## 2021-05-18 RX ORDER — HYDROCHLOROTHIAZIDE 25 MG/1
25 TABLET ORAL DAILY
Qty: 90 TABLET | Refills: 2 | Status: SHIPPED | OUTPATIENT
Start: 2021-05-18 | End: 2022-05-18

## 2021-05-18 RX ORDER — PANTOPRAZOLE SODIUM 40 MG/1
40 TABLET, DELAYED RELEASE ORAL DAILY
Qty: 30 TABLET | Refills: 11 | Status: SHIPPED | OUTPATIENT
Start: 2021-05-18 | End: 2022-05-18

## 2021-09-08 DIAGNOSIS — Z12.31 OTHER SCREENING MAMMOGRAM: ICD-10-CM

## 2022-01-10 ENCOUNTER — PATIENT MESSAGE (OUTPATIENT)
Dept: ADMINISTRATIVE | Facility: HOSPITAL | Age: 67
End: 2022-01-10
Payer: MEDICARE

## 2022-01-27 DIAGNOSIS — I10 ESSENTIAL HYPERTENSION: ICD-10-CM

## 2022-04-04 ENCOUNTER — PATIENT MESSAGE (OUTPATIENT)
Dept: ADMINISTRATIVE | Facility: HOSPITAL | Age: 67
End: 2022-04-04
Payer: MEDICARE

## 2022-05-11 DIAGNOSIS — Z11.59 NEED FOR HEPATITIS C SCREENING TEST: ICD-10-CM

## 2022-05-31 ENCOUNTER — PATIENT MESSAGE (OUTPATIENT)
Dept: ADMINISTRATIVE | Facility: HOSPITAL | Age: 67
End: 2022-05-31
Payer: MEDICARE

## 2022-08-24 ENCOUNTER — PATIENT MESSAGE (OUTPATIENT)
Dept: ADMINISTRATIVE | Facility: HOSPITAL | Age: 67
End: 2022-08-24
Payer: MEDICARE

## 2022-10-11 ENCOUNTER — PATIENT MESSAGE (OUTPATIENT)
Dept: ADMINISTRATIVE | Facility: HOSPITAL | Age: 67
End: 2022-10-11
Payer: MEDICARE

## 2022-11-28 ENCOUNTER — PATIENT MESSAGE (OUTPATIENT)
Dept: ADMINISTRATIVE | Facility: HOSPITAL | Age: 67
End: 2022-11-28
Payer: MEDICARE

## 2023-01-17 ENCOUNTER — PATIENT MESSAGE (OUTPATIENT)
Dept: ADMINISTRATIVE | Facility: HOSPITAL | Age: 68
End: 2023-01-17
Payer: MEDICARE

## (undated) DEVICE — IRRIGATOR ENDOSCOPY DISP.

## (undated) DEVICE — SUT 0 VICRYL / UR6 (J603)

## (undated) DEVICE — CANNULA LAP SEAL Z THRD 5X100

## (undated) DEVICE — SOL CLEARIFY VISUALIZATION LAP

## (undated) DEVICE — SUT ETHIBOND 0 CR/MO-7 8-18

## (undated) DEVICE — SOL IRRI STRL WATER 1000ML

## (undated) DEVICE — GLOVE SURG ULTRA TOUCH 7

## (undated) DEVICE — GLOVE PI ULTRA TOUCH G SURGEON

## (undated) DEVICE — SEE MEDLINE ITEM 156964

## (undated) DEVICE — FILTER LAPAROSCOPIC SMOKE EVAC

## (undated) DEVICE — BAG TISS RETRV MONARCH 10MM

## (undated) DEVICE — GLOVE BIOGEL 7.5

## (undated) DEVICE — GOWN B1 X-LG X-LONG

## (undated) DEVICE — SCISSOR TIP ENDOCUT DISPOSABLE

## (undated) DEVICE — SHEARS HARMONIC 5CM 36CM

## (undated) DEVICE — SUT CTD VICRYL 4-0 BR PS-2

## (undated) DEVICE — TUBING INSUFFLATION HEATED

## (undated) DEVICE — TROCAR ENDO Z THREAD KII 5X100

## (undated) DEVICE — GLOVE BIOGEL PI ORTHO PRO 7.5

## (undated) DEVICE — APPLIER CLIP ENDO LIGAMAX 5MM

## (undated) DEVICE — SLEEVE SCD EXPRESS CALF MEDIUM

## (undated) DEVICE — KIT ENDO CARRY ON PROCEDURE

## (undated) DEVICE — ELECTRODE REM PLYHSV RETURN 9

## (undated) DEVICE — TROCAR KII BLLN 12MM 10CM

## (undated) DEVICE — DRAPE ABDOMINAL TIBURON 14X11

## (undated) DEVICE — ADHESIVE DERMABOND ADVANCED

## (undated) DEVICE — SOL NACL IRR 3000ML

## (undated) DEVICE — BLADE EZ CLEAN 2 1/2

## (undated) DEVICE — ELECTRODE SPATULA 5MMX 32CM

## (undated) DEVICE — SUT CTD VICRYL 3-0 CR/SH

## (undated) DEVICE — CANISTER SUCTION 3000CC